# Patient Record
Sex: FEMALE | Race: BLACK OR AFRICAN AMERICAN | NOT HISPANIC OR LATINO | Employment: UNEMPLOYED | URBAN - METROPOLITAN AREA
[De-identification: names, ages, dates, MRNs, and addresses within clinical notes are randomized per-mention and may not be internally consistent; named-entity substitution may affect disease eponyms.]

---

## 2020-01-22 PROBLEM — K64.8 OTHER HEMORRHOIDS: Status: ACTIVE | Noted: 2020-01-22

## 2023-06-12 ENCOUNTER — HOSPITAL ENCOUNTER (OUTPATIENT)
Dept: MAMMOGRAPHY | Facility: HOSPITAL | Age: 58
Discharge: HOME | End: 2023-06-12
Admitting: FAMILY MEDICINE
Payer: MEDICAID

## 2023-06-12 DIAGNOSIS — Z12.31 BREAST CANCER SCREENING BY MAMMOGRAM: ICD-10-CM

## 2023-06-12 PROCEDURE — 77063 BREAST TOMOSYNTHESIS BI: CPT

## 2023-06-12 PROCEDURE — 77067 SCR MAMMO BI INCL CAD: CPT

## 2023-06-15 ENCOUNTER — HOSPITAL ENCOUNTER (OUTPATIENT)
Dept: CARDIOLOGY | Facility: HOSPITAL | Age: 58
Discharge: HOME | End: 2023-06-15
Payer: MEDICARE

## 2023-06-15 ENCOUNTER — HOSPITAL ENCOUNTER (OUTPATIENT)
Dept: GENERAL RADIOLOGY | Facility: HOSPITAL | Age: 58
Discharge: HOME | End: 2023-06-15
Payer: MEDICARE

## 2023-06-15 ENCOUNTER — TRANSCRIBE ORDERS (OUTPATIENT)
Dept: FAMILY MEDICINE CLINIC | Facility: CLINIC | Age: 58
End: 2023-06-15

## 2023-06-15 ENCOUNTER — OFFICE VISIT (OUTPATIENT)
Dept: FAMILY MEDICINE CLINIC | Facility: CLINIC | Age: 58
End: 2023-06-15
Payer: MEDICARE

## 2023-06-15 VITALS
SYSTOLIC BLOOD PRESSURE: 130 MMHG | HEIGHT: 65 IN | DIASTOLIC BLOOD PRESSURE: 78 MMHG | BODY MASS INDEX: 29.59 KG/M2 | WEIGHT: 177.6 LBS

## 2023-06-15 DIAGNOSIS — R10.31 RIGHT INGUINAL PAIN: Primary | ICD-10-CM

## 2023-06-15 DIAGNOSIS — R60.0 LOCALIZED EDEMA: ICD-10-CM

## 2023-06-15 DIAGNOSIS — R10.31 RIGHT INGUINAL PAIN: ICD-10-CM

## 2023-06-15 LAB
BH CV LOWER VASCULAR LEFT COMMON FEMORAL PHASIC: NORMAL
BH CV LOWER VASCULAR LEFT COMMON FEMORAL SPONT: NORMAL
BH CV LOWER VASCULAR RIGHT COMMON FEMORAL COMPRESS: NORMAL
BH CV LOWER VASCULAR RIGHT COMMON FEMORAL PHASIC: NORMAL
BH CV LOWER VASCULAR RIGHT COMMON FEMORAL SPONT: NORMAL
BH CV LOWER VASCULAR RIGHT DISTAL FEMORAL COMPRESS: NORMAL
BH CV LOWER VASCULAR RIGHT DISTAL FEMORAL PHASIC: NORMAL
BH CV LOWER VASCULAR RIGHT DISTAL FEMORAL SPONT: NORMAL
BH CV LOWER VASCULAR RIGHT GASTRONEMIUS COMPRESS: NORMAL
BH CV LOWER VASCULAR RIGHT GREATER SAPH AK COMPRESS: NORMAL
BH CV LOWER VASCULAR RIGHT GREATER SAPH BK COMPRESS: NORMAL
BH CV LOWER VASCULAR RIGHT LESSER SAPH COMPRESS: NORMAL
BH CV LOWER VASCULAR RIGHT MID FEMORAL COMPRESS: NORMAL
BH CV LOWER VASCULAR RIGHT MID FEMORAL PHASIC: NORMAL
BH CV LOWER VASCULAR RIGHT MID FEMORAL SPONT: NORMAL
BH CV LOWER VASCULAR RIGHT PERONEAL COMPRESS: NORMAL
BH CV LOWER VASCULAR RIGHT POPLITEAL COMPRESS: NORMAL
BH CV LOWER VASCULAR RIGHT POPLITEAL PHASIC: NORMAL
BH CV LOWER VASCULAR RIGHT POPLITEAL SPONT: NORMAL
BH CV LOWER VASCULAR RIGHT POSTERIOR TIBIAL COMPRESS: NORMAL
BH CV LOWER VASCULAR RIGHT PROFUNDA FEMORAL PHASIC: NORMAL
BH CV LOWER VASCULAR RIGHT PROFUNDA FEMORAL SPONT: NORMAL
BH CV LOWER VASCULAR RIGHT PROXIMAL FEMORAL COMPRESS: NORMAL
BH CV LOWER VASCULAR RIGHT PROXIMAL FEMORAL PHASIC: NORMAL
BH CV LOWER VASCULAR RIGHT PROXIMAL FEMORAL SPONT: NORMAL
BH CV LOWER VASCULAR RIGHT SAPHENOFEMORAL JUNCTION COMPRESS: NORMAL
BH CV LOWER VASCULAR RIGHT SAPHENOFEMORAL JUNCTION PHASIC: NORMAL
BH CV LOWER VASCULAR RIGHT SAPHENOFEMORAL JUNCTION SPONT: NORMAL

## 2023-06-15 PROCEDURE — 93971 EXTREMITY STUDY: CPT

## 2023-06-15 PROCEDURE — 3078F DIAST BP <80 MM HG: CPT | Performed by: FAMILY MEDICINE

## 2023-06-15 PROCEDURE — 73502 X-RAY EXAM HIP UNI 2-3 VIEWS: CPT

## 2023-06-15 PROCEDURE — 99214 OFFICE O/P EST MOD 30 MIN: CPT | Performed by: FAMILY MEDICINE

## 2023-06-15 PROCEDURE — 3075F SYST BP GE 130 - 139MM HG: CPT | Performed by: FAMILY MEDICINE

## 2023-06-15 NOTE — PROGRESS NOTES
Established Patient Office Visit      Patient Name: Rosa Elena Lopez  : 1965   MRN: [id number]   Care Team: Patient Care Team:  Real Lopez DO as PCP - General (Family Medicine)    Chief Complaint:    Chief Complaint   Patient presents with    Leg Pain     Pt co right leg pain for approximately 1 week       History of Present Illness: Rosa Elena Lopez is a 58 y.o. female who is here today for chief complaint.    HPI    She reports severe right inguinal/hip pain for the last 1 week.  She denies injury.  She has not taken anything over-the-counter for the pain including Tylenol or ibuprofen.  The pain is constant and not position dependent.  It is worse with ambulation but does not resolve with rest.  Rated 9/10.  She states she has had a little bit of right leg swelling, mostly on the medial aspect of the right knee.  She denies rash, fever.    This patient is accompanied by their daughter who contributes to the history of their care.    The following portions of the patient's history were reviewed and updated as appropriate: allergies, current medications, past family history, past medical history, past social history, past surgical history and problem list.    Subjective      Review of Systems:   Review of Systems - See HPI    Past Medical History:   Past Medical History:   Diagnosis Date    Hypertension        Past Surgical History:   Past Surgical History:   Procedure Laterality Date    HERNIA REPAIR      TUBAL ABDOMINAL LIGATION         Family History:   Family History   Problem Relation Age of Onset    Breast cancer Neg Hx     Ovarian cancer Neg Hx        Social History:   Social History     Socioeconomic History    Marital status: Single   Tobacco Use    Smoking status: Never    Smokeless tobacco: Never   Substance and Sexual Activity    Alcohol use: No    Drug use: No    Sexual activity: Defer       Tobacco History:   Social History     Tobacco Use   Smoking Status Never   Smokeless  "Tobacco Never       Medications:     Current Outpatient Medications:     amitriptyline (ELAVIL) 25 MG tablet, Take 1 tablet by mouth Every Night., Disp: 90 tablet, Rfl: 1    amLODIPine (NORVASC) 10 MG tablet, Take 1 tablet by mouth Daily., Disp: 90 tablet, Rfl: 3    Blood Pressure kit, Use to check your blood pressure once daily, Disp: 1 each, Rfl: 0    lisinopril (PRINIVIL,ZESTRIL) 20 MG tablet, Take 1 tablet by mouth Daily., Disp: 90 tablet, Rfl: 3    Allergies:   No Known Allergies    Objective   Objective     Physical Exam:  Vital Signs:   Vitals:    06/15/23 0929   BP: 130/78   BP Location: Right arm   Patient Position: Sitting   Cuff Size: Adult   Weight: 80.6 kg (177 lb 9.6 oz)   Height: 165.1 cm (65\")     Body mass index is 29.55 kg/m².     Physical Exam  Nursing note reviewed  Const: In visible pain both at rest and worsening with any motion  MSK: Tender to palpation right groin, pain with internal and external rotation at the right hip.  No mass appreciated, no particular joint effusion, no overlying erythema.  There is a very small amount of medial right knee swelling.  She is able to walk under her own power but avoids weightbearing on the right lower extremity  Procedures/Radiology     Procedures  No radiology results for the last 7 days     Assessment & Plan   Assessment / Plan      Assessment/Plan:   Problems Addressed This Visit  Diagnoses and all orders for this visit:    1. Right inguinal pain (Primary)  Comments:  Mild RLE swelling. Severe pain x 1 week  Orders:  -     XR Hip With or Without Pelvis 2 - 3 View Right; Future  -     US Venous Doppler Lower Extremity Right (duplex); Future    2. Localized edema  -     US Venous Doppler Lower Extremity Right (duplex); Future      Problem List Items Addressed This Visit    None  Visit Diagnoses       Right inguinal pain    -  Primary    Mild RLE swelling. Severe pain x 1 week    Relevant Orders    XR Hip With or Without Pelvis 2 - 3 View Right    US " Venous Doppler Lower Extremity Right (duplex)    Localized edema        Relevant Orders    US Venous Doppler Lower Extremity Right (duplex)            Differential includes osteoarthritis of the right hip with flare, labral tear, septic joint, blood clot.  I would like to have an ultrasound completed as soon as possible, with a right hip x-ray pending.  Treatment plan pending results.    There are no Patient Instructions on file for this visit.    Follow Up:   No follow-ups on file.      DO MELISSA Morales RD  [facility]  [address]  [facility] [address]  Fax [phone]  Phone [phone]

## 2023-09-01 ENCOUNTER — TELEPHONE (OUTPATIENT)
Dept: FAMILY MEDICINE CLINIC | Facility: CLINIC | Age: 58
End: 2023-09-01
Payer: MEDICARE

## 2023-09-01 ENCOUNTER — HOSPITAL ENCOUNTER (INPATIENT)
Facility: HOSPITAL | Age: 58
LOS: 1 days | Discharge: HOME | DRG: 305 | End: 2023-09-05
Attending: EMERGENCY MEDICINE | Admitting: HOSPITALIST
Payer: MEDICARE

## 2023-09-01 DIAGNOSIS — I10 HYPERTENSION, UNSPECIFIED TYPE: Primary | ICD-10-CM

## 2023-09-01 DIAGNOSIS — I16.1 HYPERTENSIVE EMERGENCY: ICD-10-CM

## 2023-09-01 LAB
ALBUMIN SERPL-MCNC: 4.4 G/DL (ref 3.5–5.2)
ALBUMIN/GLOB SERPL: 1.3 G/DL
ALP SERPL-CCNC: 128 U/L (ref 39–117)
ALT SERPL W P-5'-P-CCNC: 15 U/L (ref 1–33)
ANION GAP SERPL CALCULATED.3IONS-SCNC: 7 MMOL/L (ref 5–15)
AST SERPL-CCNC: 19 U/L (ref 1–32)
BASOPHILS # BLD AUTO: 0.04 10*3/MM3 (ref 0–0.2)
BASOPHILS NFR BLD AUTO: 0.6 % (ref 0–1.5)
BILIRUB SERPL-MCNC: 0.2 MG/DL (ref 0–1.2)
BUN SERPL-MCNC: 9 MG/DL (ref 8–23)
BUN/CREAT SERPL: 10.7 (ref 7–25)
CALCIUM SPEC-SCNC: 9.2 MG/DL (ref 8.6–10.5)
CHLORIDE SERPL-SCNC: 101 MMOL/L (ref 98–107)
CO2 SERPL-SCNC: 30 MMOL/L (ref 22–29)
CREAT SERPL-MCNC: 0.84 MG/DL (ref 0.57–1)
DEPRECATED RDW RBC AUTO: 47.2 FL (ref 37–54)
EGFRCR SERPLBLD CKD-EPI 2021: 79.7 ML/MIN/1.73
EOSINOPHIL # BLD AUTO: 0.29 10*3/MM3 (ref 0–0.4)
EOSINOPHIL NFR BLD AUTO: 4.1 % (ref 0.3–6.2)
ERYTHROCYTE [DISTWIDTH] IN BLOOD BY AUTOMATED COUNT: 15.6 % (ref 12.3–15.4)
GLOBULIN UR ELPH-MCNC: 3.3 GM/DL
GLUCOSE SERPL-MCNC: 86 MG/DL (ref 65–99)
HCT VFR BLD AUTO: 39.3 % (ref 34–46.6)
HGB BLD-MCNC: 12.7 G/DL (ref 12–15.9)
IMM GRANULOCYTES # BLD AUTO: 0.02 10*3/MM3 (ref 0–0.05)
IMM GRANULOCYTES NFR BLD AUTO: 0.3 % (ref 0–0.5)
LYMPHOCYTES # BLD AUTO: 2.46 10*3/MM3 (ref 0.7–3.1)
LYMPHOCYTES NFR BLD AUTO: 35.2 % (ref 19.6–45.3)
MCH RBC QN AUTO: 26.8 PG (ref 26.6–33)
MCHC RBC AUTO-ENTMCNC: 32.3 G/DL (ref 31.5–35.7)
MCV RBC AUTO: 83.1 FL (ref 79–97)
MONOCYTES # BLD AUTO: 0.74 10*3/MM3 (ref 0.1–0.9)
MONOCYTES NFR BLD AUTO: 10.6 % (ref 5–12)
NEUTROPHILS NFR BLD AUTO: 3.44 10*3/MM3 (ref 1.7–7)
NEUTROPHILS NFR BLD AUTO: 49.2 % (ref 42.7–76)
NRBC BLD AUTO-RTO: 0 /100 WBC (ref 0–0.2)
PLATELET # BLD AUTO: 256 10*3/MM3 (ref 140–450)
PMV BLD AUTO: 10.1 FL (ref 6–12)
POTASSIUM SERPL-SCNC: 3.9 MMOL/L (ref 3.5–5.2)
PROT SERPL-MCNC: 7.7 G/DL (ref 6–8.5)
RBC # BLD AUTO: 4.73 10*6/MM3 (ref 3.77–5.28)
SODIUM SERPL-SCNC: 138 MMOL/L (ref 136–145)
TROPONIN T SERPL HS-MCNC: 9 NG/L
WBC NRBC COR # BLD: 6.99 10*3/MM3 (ref 3.4–10.8)

## 2023-09-01 PROCEDURE — 99285 EMERGENCY DEPT VISIT HI MDM: CPT

## 2023-09-01 PROCEDURE — 93005 ELECTROCARDIOGRAM TRACING: CPT | Performed by: NURSE PRACTITIONER

## 2023-09-01 PROCEDURE — 36415 COLL VENOUS BLD VENIPUNCTURE: CPT

## 2023-09-01 PROCEDURE — 84443 ASSAY THYROID STIM HORMONE: CPT | Performed by: PEDIATRICS

## 2023-09-01 PROCEDURE — 84484 ASSAY OF TROPONIN QUANT: CPT | Performed by: NURSE PRACTITIONER

## 2023-09-01 PROCEDURE — G0378 HOSPITAL OBSERVATION PER HR: HCPCS

## 2023-09-01 PROCEDURE — 80053 COMPREHEN METABOLIC PANEL: CPT | Performed by: NURSE PRACTITIONER

## 2023-09-01 PROCEDURE — 85025 COMPLETE CBC W/AUTO DIFF WBC: CPT | Performed by: NURSE PRACTITIONER

## 2023-09-01 PROCEDURE — 93010 ELECTROCARDIOGRAM REPORT: CPT | Performed by: INTERNAL MEDICINE

## 2023-09-01 RX ORDER — LABETALOL HYDROCHLORIDE 5 MG/ML
10 INJECTION, SOLUTION INTRAVENOUS ONCE
Status: COMPLETED | OUTPATIENT
Start: 2023-09-01 | End: 2023-09-01

## 2023-09-01 RX ORDER — CLONIDINE HYDROCHLORIDE 0.1 MG/1
0.1 TABLET ORAL ONCE
Status: COMPLETED | OUTPATIENT
Start: 2023-09-01 | End: 2023-09-01

## 2023-09-01 RX ORDER — SODIUM CHLORIDE 0.9 % (FLUSH) 0.9 %
10 SYRINGE (ML) INJECTION AS NEEDED
Status: DISCONTINUED | OUTPATIENT
Start: 2023-09-01 | End: 2023-09-05 | Stop reason: HOSPADM

## 2023-09-01 RX ADMIN — Medication 10 MG: at 21:10

## 2023-09-01 RX ADMIN — CLONIDINE HYDROCHLORIDE 0.1 MG: 0.1 TABLET ORAL at 21:35

## 2023-09-01 RX ADMIN — Medication 10 MG: at 19:16

## 2023-09-01 RX ADMIN — NICARDIPINE HYDROCHLORIDE 5 MG/HR: 25 INJECTION, SOLUTION INTRAVENOUS at 23:22

## 2023-09-01 NOTE — ED PROVIDER NOTES
Subjective   History of Present Illness  Patient presents to the ER for hypertension.  Usually takes 20 mg lisinopril.  Patient does not take it regularly.  Recommended by her regular doctor to come to the ER for further evaluation.  Differential includes kidney dysfunction.  Hypertensive crisis.  Coronary artery disease.  We will need to get lab work evaluate kidney functions and medications to help with hypertension.  Patient tells me she was going to the dentist today to get some teeth pulled when they found out she was hypertensive and they sent her to the ER for further evaluation.      Review of Systems    Past Medical History:   Diagnosis Date    Hypertension        No Known Allergies    Past Surgical History:   Procedure Laterality Date    HERNIA REPAIR      TUBAL ABDOMINAL LIGATION         Family History   Problem Relation Age of Onset    Breast cancer Neg Hx     Ovarian cancer Neg Hx        Social History     Socioeconomic History    Marital status: Single   Tobacco Use    Smoking status: Never    Smokeless tobacco: Never   Substance and Sexual Activity    Alcohol use: No    Drug use: No    Sexual activity: Defer           Objective   Physical Exam  Constitutional:       Appearance: She is well-developed.   HENT:      Head: Normocephalic and atraumatic.      Right Ear: External ear normal.      Left Ear: External ear normal.      Nose: Nose normal.   Eyes:      Conjunctiva/sclera: Conjunctivae normal.      Pupils: Pupils are equal, round, and reactive to light.   Cardiovascular:      Rate and Rhythm: Normal rate and regular rhythm.      Heart sounds: Normal heart sounds.   Pulmonary:      Effort: Pulmonary effort is normal.      Breath sounds: Normal breath sounds.   Abdominal:      General: Bowel sounds are normal.      Palpations: Abdomen is soft.   Musculoskeletal:         General: Normal range of motion.      Cervical back: Normal range of motion and neck supple.   Skin:     General: Skin is warm and  dry.   Neurological:      Mental Status: She is alert and oriented to person, place, and time.   Psychiatric:         Behavior: Behavior normal.         Thought Content: Thought content normal.         Judgment: Judgment normal.       Critical Care  Performed by: Bienvenido Rivas APRN  Authorized by: Thierry Edwards MD     Critical care provider statement:     Critical care time (minutes):  35    Critical care time was exclusive of:  Separately billable procedures and treating other patients    Critical care was necessary to treat or prevent imminent or life-threatening deterioration of the following conditions: Hypertensive emergency.  IV Cardizem.    Critical care was time spent personally by me on the following activities:  Ordering and performing treatments and interventions, ordering and review of laboratory studies, ordering and review of radiographic studies, pulse oximetry, re-evaluation of patient's condition, review of old charts, obtaining history from patient or surrogate, examination of patient, evaluation of patient's response to treatment, discussions with consultants and development of treatment plan with patient or surrogate           ED Course  ED Course as of 09/01/23 2233   Fri Sep 01, 2023   1557 Awaiting labs blood pressure medicine []   1912 Awaiting bp meds []   2122 We will repeat her dose of blood pressure medication.  Old records reviewed.  She tells me she does have blood pressure medication but does not regularly take it.  I did give her good instructions about taking her blood pressure medications.  I am hesitant to add more medication to her regimen since she has not been taking her medications regularly.  I did encourage her to see her regular doctor this week for further evaluation and to review her medications and treatment compliance.  She did verbalize understanding.  If we are unable to get her blood pressure controlled here in the ER we may need to consider admission however  there has been some delays getting her blood pressure medication given the busyness of the ER we will continue to monitor. [JM]   2224 Patient diastolic still running in the 110s with a systolic anywhere between 170 up to 200.  I have secure chatted the hospitalist to discuss admission.  I did speak to the patient's daughter who tells me that she does not regularly take her medications and she may have had some insurance issues which has prevented her from taking her Norvasc. [JM]      ED Course User Index  [JM] Bienvenido Rivas, NEIL           No orders to display                                     Medical Decision Making  Problems Addressed:  Hypertensive emergency: complicated acute illness or injury    Amount and/or Complexity of Data Reviewed  External Data Reviewed: labs and radiology.  Labs: ordered.  ECG/medicine tests: ordered.    Risk  Prescription drug management.  Decision regarding hospitalization.    Critical Care  Total time providing critical care: 35 minutes      Final diagnoses:   Hypertension, unspecified type   Hypertensive emergency       ED Disposition  ED Disposition       ED Disposition   Decision to Admit    Condition   --    Comment   Level of Care: Telemetry [5]   Diagnosis: Hypertensive emergency [186550]   Admitting Physician: TAMANNA MEJIA [[id number]]   Attending Physician: TAMANNA MEJIA [[id number]]                 Real Lopez DO  [address] [facility] [address]  [phone]    Schedule an appointment as soon as possible for a visit       [facility] EMERGENCY DEPARTMENT  [address] [facility] [address] [phone]    If symptoms worsen         Medication List      No changes were made to your prescriptions during this visit.            Bienvenido Rivas APRN  09/01/23 2231

## 2023-09-01 NOTE — TELEPHONE ENCOUNTER
Pt's daughter called saying her mom's BP was 200/130 at the dentist today. I transferred the call to Dr. Lopez' nurse who advised she take her mom to the ER.   24

## 2023-09-02 ENCOUNTER — APPOINTMENT (OUTPATIENT)
Dept: CARDIOLOGY | Facility: HOSPITAL | Age: 58
DRG: 305 | End: 2023-09-02
Payer: MEDICARE

## 2023-09-02 LAB
ANION GAP SERPL CALCULATED.3IONS-SCNC: 12 MMOL/L (ref 5–15)
BASOPHILS # BLD AUTO: 0.03 10*3/MM3 (ref 0–0.2)
BASOPHILS NFR BLD AUTO: 0.4 % (ref 0–1.5)
BH CV ECHO MEAS - AO MAX PG: 7.1 MMHG
BH CV ECHO MEAS - AO MEAN PG: 4 MMHG
BH CV ECHO MEAS - AO ROOT DIAM: 3.2 CM
BH CV ECHO MEAS - AO V2 MAX: 133 CM/SEC
BH CV ECHO MEAS - AO V2 VTI: 25.2 CM
BH CV ECHO MEAS - AVA(I,D): 2.22 CM2
BH CV ECHO MEAS - EDV(CUBED): 50.7 ML
BH CV ECHO MEAS - EDV(MOD-SP2): 67 ML
BH CV ECHO MEAS - EDV(MOD-SP4): 77.6 ML
BH CV ECHO MEAS - EF(MOD-SP2): 60.4 %
BH CV ECHO MEAS - EF(MOD-SP4): 56.4 %
BH CV ECHO MEAS - ESV(CUBED): 8 ML
BH CV ECHO MEAS - ESV(MOD-SP2): 26.5 ML
BH CV ECHO MEAS - ESV(MOD-SP4): 33.8 ML
BH CV ECHO MEAS - FS: 45.9 %
BH CV ECHO MEAS - IVS/LVPW: 1.67 CM
BH CV ECHO MEAS - IVSD: 1.3 CM
BH CV ECHO MEAS - LA DIMENSION: 3.4 CM
BH CV ECHO MEAS - LAT PEAK E' VEL: 6.2 CM/SEC
BH CV ECHO MEAS - LV MASS(C)D: 82.3 GRAMS
BH CV ECHO MEAS - LV MAX PG: 3 MMHG
BH CV ECHO MEAS - LV MEAN PG: 2 MMHG
BH CV ECHO MEAS - LV V1 MAX: 85.9 CM/SEC
BH CV ECHO MEAS - LV V1 VTI: 17.8 CM
BH CV ECHO MEAS - LVIDD: 3.7 CM
BH CV ECHO MEAS - LVIDS: 2 CM
BH CV ECHO MEAS - LVOT AREA: 3.1 CM2
BH CV ECHO MEAS - LVOT DIAM: 2 CM
BH CV ECHO MEAS - LVPWD: 1.1 CM
BH CV ECHO MEAS - MED PEAK E' VEL: 4.4 CM/SEC
BH CV ECHO MEAS - MV A MAX VEL: 78.1 CM/SEC
BH CV ECHO MEAS - MV DEC SLOPE: 428 CM/SEC2
BH CV ECHO MEAS - MV DEC TIME: 0.23 MSEC
BH CV ECHO MEAS - MV E MAX VEL: 50.3 CM/SEC
BH CV ECHO MEAS - MV E/A: 0.64
BH CV ECHO MEAS - MV MAX PG: 4 MMHG
BH CV ECHO MEAS - MV MEAN PG: 1 MMHG
BH CV ECHO MEAS - MV P1/2T: 49.4 MSEC
BH CV ECHO MEAS - MV V2 VTI: 19.6 CM
BH CV ECHO MEAS - MVA(P1/2T): 4.5 CM2
BH CV ECHO MEAS - MVA(VTI): 2.9 CM2
BH CV ECHO MEAS - PA ACC TIME: 0.13 SEC
BH CV ECHO MEAS - RAP SYSTOLE: 3 MMHG
BH CV ECHO MEAS - RVSP: 19 MMHG
BH CV ECHO MEAS - SV(LVOT): 55.9 ML
BH CV ECHO MEAS - SV(MOD-SP2): 40.5 ML
BH CV ECHO MEAS - SV(MOD-SP4): 43.8 ML
BH CV ECHO MEAS - TAPSE (>1.6): 1.92 CM
BH CV ECHO MEAS - TR MAX PG: 16.8 MMHG
BH CV ECHO MEAS - TR MAX VEL: 204.5 CM/SEC
BH CV ECHO MEASUREMENTS AVERAGE E/E' RATIO: 9.49
BH CV XLRA - RV BASE: 2.8 CM
BH CV XLRA - RV LENGTH: 7.5 CM
BH CV XLRA - RV MID: 2.4 CM
BH CV XLRA - TDI S': 14.3 CM/SEC
BUN SERPL-MCNC: 10 MG/DL (ref 8–23)
BUN/CREAT SERPL: 12.3 (ref 7–25)
CALCIUM SPEC-SCNC: 9 MG/DL (ref 8.6–10.5)
CHLORIDE SERPL-SCNC: 102 MMOL/L (ref 98–107)
CHOLEST SERPL-MCNC: 194 MG/DL (ref 0–200)
CO2 SERPL-SCNC: 26 MMOL/L (ref 22–29)
CREAT SERPL-MCNC: 0.81 MG/DL (ref 0.57–1)
DEPRECATED RDW RBC AUTO: 46 FL (ref 37–54)
EGFRCR SERPLBLD CKD-EPI 2021: 83.2 ML/MIN/1.73
EOSINOPHIL # BLD AUTO: 0.32 10*3/MM3 (ref 0–0.4)
EOSINOPHIL NFR BLD AUTO: 4.4 % (ref 0.3–6.2)
ERYTHROCYTE [DISTWIDTH] IN BLOOD BY AUTOMATED COUNT: 15.5 % (ref 12.3–15.4)
GLUCOSE SERPL-MCNC: 96 MG/DL (ref 65–99)
HBA1C MFR BLD: 6.3 % (ref 4.8–5.6)
HCT VFR BLD AUTO: 38.2 % (ref 34–46.6)
HDLC SERPL-MCNC: 50 MG/DL (ref 40–60)
HGB BLD-MCNC: 12.3 G/DL (ref 12–15.9)
IMM GRANULOCYTES # BLD AUTO: 0.02 10*3/MM3 (ref 0–0.05)
IMM GRANULOCYTES NFR BLD AUTO: 0.3 % (ref 0–0.5)
LDLC SERPL CALC-MCNC: 124 MG/DL (ref 0–100)
LDLC/HDLC SERPL: 2.42 {RATIO}
LEFT ATRIUM VOLUME INDEX: 12.1 ML/M2
LYMPHOCYTES # BLD AUTO: 2.66 10*3/MM3 (ref 0.7–3.1)
LYMPHOCYTES NFR BLD AUTO: 36.2 % (ref 19.6–45.3)
MAGNESIUM SERPL-MCNC: 2 MG/DL (ref 1.6–2.4)
MCH RBC QN AUTO: 26.3 PG (ref 26.6–33)
MCHC RBC AUTO-ENTMCNC: 32.2 G/DL (ref 31.5–35.7)
MCV RBC AUTO: 81.8 FL (ref 79–97)
MONOCYTES # BLD AUTO: 0.75 10*3/MM3 (ref 0.1–0.9)
MONOCYTES NFR BLD AUTO: 10.2 % (ref 5–12)
NEUTROPHILS NFR BLD AUTO: 3.56 10*3/MM3 (ref 1.7–7)
NEUTROPHILS NFR BLD AUTO: 48.5 % (ref 42.7–76)
NRBC BLD AUTO-RTO: 0 /100 WBC (ref 0–0.2)
PHOSPHATE SERPL-MCNC: 3.4 MG/DL (ref 2.5–4.5)
PLATELET # BLD AUTO: 257 10*3/MM3 (ref 140–450)
PMV BLD AUTO: 10.8 FL (ref 6–12)
POTASSIUM SERPL-SCNC: 4 MMOL/L (ref 3.5–5.2)
RBC # BLD AUTO: 4.67 10*6/MM3 (ref 3.77–5.28)
SODIUM SERPL-SCNC: 140 MMOL/L (ref 136–145)
TRIGL SERPL-MCNC: 114 MG/DL (ref 0–150)
TSH SERPL DL<=0.05 MIU/L-ACNC: 1.07 UIU/ML (ref 0.27–4.2)
VLDLC SERPL-MCNC: 20 MG/DL (ref 5–40)
WBC NRBC COR # BLD: 7.34 10*3/MM3 (ref 3.4–10.8)

## 2023-09-02 PROCEDURE — 93306 TTE W/DOPPLER COMPLETE: CPT | Performed by: INTERNAL MEDICINE

## 2023-09-02 PROCEDURE — 85025 COMPLETE CBC W/AUTO DIFF WBC: CPT | Performed by: PEDIATRICS

## 2023-09-02 PROCEDURE — 83735 ASSAY OF MAGNESIUM: CPT | Performed by: PEDIATRICS

## 2023-09-02 PROCEDURE — 93306 TTE W/DOPPLER COMPLETE: CPT

## 2023-09-02 PROCEDURE — 83036 HEMOGLOBIN GLYCOSYLATED A1C: CPT | Performed by: PEDIATRICS

## 2023-09-02 PROCEDURE — G0378 HOSPITAL OBSERVATION PER HR: HCPCS

## 2023-09-02 PROCEDURE — 80048 BASIC METABOLIC PNL TOTAL CA: CPT | Performed by: PEDIATRICS

## 2023-09-02 PROCEDURE — 80061 LIPID PANEL: CPT | Performed by: PEDIATRICS

## 2023-09-02 PROCEDURE — 84100 ASSAY OF PHOSPHORUS: CPT | Performed by: PEDIATRICS

## 2023-09-02 RX ORDER — CHOLECALCIFEROL (VITAMIN D3) 125 MCG
5 CAPSULE ORAL NIGHTLY PRN
Status: DISCONTINUED | OUTPATIENT
Start: 2023-09-02 | End: 2023-09-05 | Stop reason: HOSPADM

## 2023-09-02 RX ORDER — SODIUM CHLORIDE 9 MG/ML
40 INJECTION, SOLUTION INTRAVENOUS AS NEEDED
Status: DISCONTINUED | OUTPATIENT
Start: 2023-09-02 | End: 2023-09-05 | Stop reason: HOSPADM

## 2023-09-02 RX ORDER — SODIUM CHLORIDE 0.9 % (FLUSH) 0.9 %
10 SYRINGE (ML) INJECTION AS NEEDED
Status: DISCONTINUED | OUTPATIENT
Start: 2023-09-02 | End: 2023-09-05 | Stop reason: HOSPADM

## 2023-09-02 RX ORDER — POLYETHYLENE GLYCOL 3350 17 G/17G
17 POWDER, FOR SOLUTION ORAL DAILY PRN
Status: DISCONTINUED | OUTPATIENT
Start: 2023-09-02 | End: 2023-09-05 | Stop reason: HOSPADM

## 2023-09-02 RX ORDER — BISACODYL 5 MG/1
5 TABLET, DELAYED RELEASE ORAL DAILY PRN
Status: DISCONTINUED | OUTPATIENT
Start: 2023-09-02 | End: 2023-09-05 | Stop reason: HOSPADM

## 2023-09-02 RX ORDER — BISACODYL 10 MG
10 SUPPOSITORY, RECTAL RECTAL DAILY PRN
Status: DISCONTINUED | OUTPATIENT
Start: 2023-09-02 | End: 2023-09-05 | Stop reason: HOSPADM

## 2023-09-02 RX ORDER — NITROGLYCERIN 0.4 MG/1
0.4 TABLET SUBLINGUAL
Status: DISCONTINUED | OUTPATIENT
Start: 2023-09-02 | End: 2023-09-05 | Stop reason: HOSPADM

## 2023-09-02 RX ORDER — LISINOPRIL 20 MG/1
20 TABLET ORAL DAILY
Status: DISCONTINUED | OUTPATIENT
Start: 2023-09-02 | End: 2023-09-05

## 2023-09-02 RX ORDER — AMOXICILLIN 250 MG
2 CAPSULE ORAL 2 TIMES DAILY
Status: DISCONTINUED | OUTPATIENT
Start: 2023-09-02 | End: 2023-09-05 | Stop reason: HOSPADM

## 2023-09-02 RX ORDER — SODIUM CHLORIDE 0.9 % (FLUSH) 0.9 %
10 SYRINGE (ML) INJECTION EVERY 12 HOURS SCHEDULED
Status: DISCONTINUED | OUTPATIENT
Start: 2023-09-02 | End: 2023-09-05 | Stop reason: HOSPADM

## 2023-09-02 RX ORDER — IBUPROFEN 400 MG/1
600 TABLET ORAL EVERY 6 HOURS PRN
Status: DISCONTINUED | OUTPATIENT
Start: 2023-09-02 | End: 2023-09-05 | Stop reason: HOSPADM

## 2023-09-02 RX ORDER — ACETAMINOPHEN 325 MG/1
650 TABLET ORAL EVERY 4 HOURS PRN
Status: DISCONTINUED | OUTPATIENT
Start: 2023-09-02 | End: 2023-09-05 | Stop reason: HOSPADM

## 2023-09-02 RX ADMIN — Medication 10 ML: at 21:13

## 2023-09-02 RX ADMIN — LISINOPRIL 20 MG: 20 TABLET ORAL at 08:28

## 2023-09-02 RX ADMIN — Medication 10 ML: at 08:29

## 2023-09-02 RX ADMIN — SENNOSIDES AND DOCUSATE SODIUM 2 TABLET: 8.6; 5 TABLET ORAL at 08:28

## 2023-09-02 NOTE — H&P
[facility] Medicine Services  HISTORY AND PHYSICAL    Patient Name: Rosa Elena Lopez  : 1965  MRN: [id number]  Primary Care Physician: Real Lopez DO  Date of admission: 2023      Subjective   Subjective     Chief Complaint:  High blood pressure    HPI:  Rosa Elena Lopez is a 58 y.o. female with a history of hypertension was sent here by her PCP with elevated blood pressures.  Patient has been diagnosed with hypertension in the past but does not remember to take her medicines due to not feeling back.  Blood pressure has been high recently on multiple occasions because she was going to the dentist to get her teeth extracted and could not undergo the procedure due to her high blood pressure.  She does admit to intermittent dizziness as well as an almost daily headache.  She is unable to tell me anything that exacerbates or helps her headache.  She does also complain of some shortness of breath that occurs in the middle of the night, but no orthopnea.  Denies any chest pain.  No history of lower extremity edema.  Daughter gives most of the history over the phone and states that she was supposed to be on 2 blood pressure medicines but the insurance company was not paying for the second one.      Review of Systems   Constitutional:  Negative for activity change, appetite change and fever.   Eyes:         Needs glasses   Respiratory:          +PND   Cardiovascular:  Negative for chest pain, palpitations and leg swelling.   Gastrointestinal:  Negative for diarrhea, nausea and vomiting.   Endocrine: Negative.    Genitourinary: Negative.    Musculoskeletal: Negative.    Skin: Negative.    Neurological:  Positive for headaches. Negative for dizziness.   Psychiatric/Behavioral: Negative.          Personal History     Past Medical History:   Diagnosis Date    Hypertension              Past Surgical History:   Procedure Laterality Date    HERNIA REPAIR      TUBAL ABDOMINAL  LIGATION         Family History: family history is not on file.     Social History:  reports that she has never smoked. She has never used smokeless tobacco. She reports that she does not drink alcohol and does not use drugs.  Social History     Social History Narrative    Lives with daughter       Medications:  Available home medication information reviewed.  (Not in a hospital admission)      No Known Allergies    Objective   Objective     Vital Signs:   Temp:  [98.2 °F (36.8 °C)] 98.2 °F (36.8 °C)  Heart Rate:  [66-94] 68  Resp:  [18] 18  BP: (169-200)/(104-118) 178/112       Physical Exam   Constitutional: Awake, alert  Eyes: PERRLA, sclerae anicteric, no conjunctival injection  HENT: NCAT, mucous membranes moist, multiple missing teeth  Neck: Supple, no thyromegaly, no lymphadenopathy, trachea midline  Respiratory: Clear to auscultation bilaterally, nonlabored respirations   Cardiovascular: RRR, soft 2/6 systolic murmurs, palpable pedal pulses bilaterally  Gastrointestinal: Positive bowel sounds, soft, nontender, nondistended  Musculoskeletal: No bilateral ankle edema, no clubbing or cyanosis to extremities  Psychiatric: Appropriate affect, cooperative  Neurologic: Oriented x 3, strength symmetric in all extremities, Cranial Nerves grossly intact to confrontation, speech clear  Skin: No rashes      Result Review:  I have personally reviewed the results from the time of this admission to 9/1/2023 23:15 EDT and agree with these findings:  [x]  Laboratory list / accordion  []  Microbiology  []  Radiology  [x]  EKG/Telemetry   []  Cardiology/Vascular   []  Pathology  []  Old records  []  Other:  Most notable findings include:         LAB RESULTS:      Lab 09/01/23  1521   WBC 6.99   HEMOGLOBIN 12.7   HEMATOCRIT 39.3   PLATELETS 256   NEUTROS ABS 3.44   IMMATURE GRANS (ABS) 0.02   LYMPHS ABS 2.46   MONOS ABS 0.74   EOS ABS 0.29   MCV 83.1         Lab 09/01/23  1521   SODIUM 138   POTASSIUM 3.9   CHLORIDE 101   CO2  30.0*   ANION GAP 7.0   BUN 9   CREATININE 0.84   EGFR 79.7   GLUCOSE 86   CALCIUM 9.2         Lab 09/01/23  1521   TOTAL PROTEIN 7.7   ALBUMIN 4.4   GLOBULIN 3.3   ALT (SGPT) 15   AST (SGOT) 19   BILIRUBIN 0.2   ALK PHOS 128*         Lab 09/01/23  1521   HSTROP T 9                     Microbiology Results (last 10 days)       ** No results found for the last 240 hours. 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 female with a history of hypertension being admitted for hypertensive emergency that has been resistant to multiple medicines given in the emergency department.    Hypertensive emergency  -Start patient on a Cardene drip and can titrate up as needed.  Goal will be to bring BP down slowly, with goal of SBP between 160-180, DBP .  -Cont home lisinopril.  Most likely will need 2nd agent.  Discussed with pt and daughter the importance of compliance.  -No evidence of end organ damage at this time.  -Check HgA1c and Lipid panel.  Prior lipid panel elevated total and LDL, probably would benefit from statin.  -TSH    Dyspnea--PND  -ECHO in AM.    DVT prophylaxis:  ambulatory      CODE STATUS:    Code Status and Medical Interventions:   Ordered at: 09/01/23 2301     Level Of Support Discussed With:    Patient     Code Status (Patient has no pulse and is not breathing):    CPR (Attempt to Resuscitate)     Medical Interventions (Patient has pulse or is breathing):    Full Support       Expected Discharge  Expected Discharge Date: 9/2/2023; Expected Discharge Time:      Laurie Giang MD  09/01/23

## 2023-09-02 NOTE — PROGRESS NOTES
[facility] Medicine Services  PROGRESS NOTE    Patient Name: Rosa Elena Lopez  : 1965  MRN: [id number]    Date of Admission: 2023  Primary Care Physician: Real Lopez DO    Subjective   Subjective     CC:  Hypertensive urgency    HPI:  Resting in bed no acute distress but complains of some lightheadedness.  Does not have any other specific complaint.  No fever or chills.  No chest pain or palpitation or shortness of breath.  No nausea vomiting or diarrhea or abdominal pain.  No visual changes.    ROS:  As above    Objective   Objective     Vital Signs:   Temp:  [97.8 °F (36.6 °C)-98.5 °F (36.9 °C)] 98.5 °F (36.9 °C)  Heart Rate:  [66-90] 80  Resp:  [18] 18  BP: (127-200)/() 141/94     Physical Exam:  Constitutional: No acute distress, awake, alert  HENT: NCAT, mucous membranes moist  Respiratory: Clear to auscultation bilaterally, respiratory effort normal   Cardiovascular: RRR, no murmurs, rubs, or gallops  Gastrointestinal: Positive bowel sounds, soft, nontender, nondistended  Musculoskeletal: No bilateral ankle edema  Psychiatric: Appropriate affect, cooperative  Neurologic: Oriented x 3, strength symmetric in all extremities, Cranial Nerves grossly intact to confrontation, speech clear  Skin: No rashes     Results Reviewed:  LAB RESULTS:      Lab 23  0536 23  1521   WBC 7.34 6.99   HEMOGLOBIN 12.3 12.7   HEMATOCRIT 38.2 39.3   PLATELETS 257 256   NEUTROS ABS 3.56 3.44   IMMATURE GRANS (ABS) 0.02 0.02   LYMPHS ABS 2.66 2.46   MONOS ABS 0.75 0.74   EOS ABS 0.32 0.29   MCV 81.8 83.1         Lab 23  0536 23  1521   SODIUM 140 138   POTASSIUM 4.0 3.9   CHLORIDE 102 101   CO2 26.0 30.0*   ANION GAP 12.0 7.0   BUN 10 9   CREATININE 0.81 0.84   EGFR 83.2 79.7   GLUCOSE 96 86   CALCIUM 9.0 9.2   MAGNESIUM 2.0  --    PHOSPHORUS 3.4  --    HEMOGLOBIN A1C 6.30*  --    TSH  --  1.070         Lab 23  1521   TOTAL PROTEIN 7.7   ALBUMIN 4.4    GLOBULIN 3.3   ALT (SGPT) 15   AST (SGOT) 19   BILIRUBIN 0.2   ALK PHOS 128*         Lab 09/01/23  1521   HSTROP T 9         Lab 09/02/23  0536   CHOLESTEROL 194   LDL CHOL 124*   HDL CHOL 50   TRIGLYCERIDES 114             Brief Urine Lab Results       None            Microbiology Results Abnormal       None            No radiology results from the last 24 hrs        Current medications:  Scheduled Meds:lisinopril, 20 mg, Oral, Daily  senna-docusate sodium, 2 tablet, Oral, BID  sodium chloride, 10 mL, Intravenous, Q12H      Continuous Infusions:niCARdipine, 5-15 mg/hr, Last Rate: Stopped (09/02/23 0930)      PRN Meds:.  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        Brief Hospital Course to date:  Rosa Elena Lopez is a 58 y.o. female . female with a history of hypertension being admitted for hypertensive emergency that has been resistant to multiple medicines given in the emergency department.     Hypertensive emergency  -Start patient on a Cardene drip Bullets now off the drip.  She was started on lisinopril 20 mg p.o. and currently blood pressure systolically is around 140s to 150s.  -No evidence of end organ damage at this time.  -Hemoglobin A1c is elevated at 6.30 but patient does not have any diagnosis of diabetes.       Dyspnea--PND  -Echo pending.         Expected Discharge Location and Transportation: Home  Expected Discharge soon, possibly tomorrow.  Expected Discharge Date: 9/2/2023; Expected Discharge Time:      DVT prophylaxis:  Mechanical DVT prophylaxis orders are  present.     AM-PAC 6 Clicks Score (PT): 24 (09/02/23 0800)    CODE STATUS:   Code Status and Medical Interventions:   Ordered at: 09/01/23 2301     Level Of Support Discussed With:    Patient     Code Status (Patient has no pulse and is not breathing):    CPR (Attempt to Resuscitate)     Medical Interventions (Patient has pulse or is breathing):    Full Support       Luis Dong MD  09/02/23

## 2023-09-02 NOTE — PLAN OF CARE
Goal Outcome Evaluation:  Plan of Care Reviewed With: patient           Outcome Evaluation: PT RESTING IN BED BLOOD PRESSURE ON CARDENE GTT /82

## 2023-09-02 NOTE — DISCHARGE INSTRUCTIONS
Please take your pressure medication as advised.  Touch base with your regular doctor early this week to review your blood pressure medications and your compliance.

## 2023-09-03 ENCOUNTER — APPOINTMENT (OUTPATIENT)
Dept: CARDIOLOGY | Facility: HOSPITAL | Age: 58
DRG: 305 | End: 2023-09-03
Payer: MEDICARE

## 2023-09-03 ENCOUNTER — APPOINTMENT (OUTPATIENT)
Dept: CT IMAGING | Facility: HOSPITAL | Age: 58
DRG: 305 | End: 2023-09-03
Payer: MEDICARE

## 2023-09-03 LAB
ANION GAP SERPL CALCULATED.3IONS-SCNC: 9 MMOL/L (ref 5–15)
BUN SERPL-MCNC: 14 MG/DL (ref 8–23)
BUN/CREAT SERPL: 14.9 (ref 7–25)
CALCIUM SPEC-SCNC: 9.5 MG/DL (ref 8.6–10.5)
CHLORIDE SERPL-SCNC: 104 MMOL/L (ref 98–107)
CO2 SERPL-SCNC: 25 MMOL/L (ref 22–29)
CREAT SERPL-MCNC: 0.94 MG/DL (ref 0.57–1)
EGFRCR SERPLBLD CKD-EPI 2021: 69.6 ML/MIN/1.73
GLUCOSE SERPL-MCNC: 101 MG/DL (ref 65–99)
POTASSIUM SERPL-SCNC: 4.2 MMOL/L (ref 3.5–5.2)
QT INTERVAL: 372 MS
QTC INTERVAL: 409 MS
SODIUM SERPL-SCNC: 138 MMOL/L (ref 136–145)

## 2023-09-03 PROCEDURE — 70450 CT HEAD/BRAIN W/O DYE: CPT

## 2023-09-03 PROCEDURE — 93880 EXTRACRANIAL BILAT STUDY: CPT | Performed by: INTERNAL MEDICINE

## 2023-09-03 PROCEDURE — 80048 BASIC METABOLIC PNL TOTAL CA: CPT | Performed by: INTERNAL MEDICINE

## 2023-09-03 PROCEDURE — G0378 HOSPITAL OBSERVATION PER HR: HCPCS

## 2023-09-03 PROCEDURE — 93880 EXTRACRANIAL BILAT STUDY: CPT

## 2023-09-03 RX ORDER — MECLIZINE HYDROCHLORIDE 25 MG/1
12.5 TABLET ORAL EVERY 8 HOURS SCHEDULED
Status: DISCONTINUED | OUTPATIENT
Start: 2023-09-03 | End: 2023-09-05 | Stop reason: HOSPADM

## 2023-09-03 RX ADMIN — MECLIZINE 12.5 MG: 25 TABLET ORAL at 17:25

## 2023-09-03 RX ADMIN — Medication 10 ML: at 22:28

## 2023-09-03 RX ADMIN — SENNOSIDES AND DOCUSATE SODIUM 2 TABLET: 8.6; 5 TABLET ORAL at 22:23

## 2023-09-03 RX ADMIN — MECLIZINE 12.5 MG: 25 TABLET ORAL at 22:23

## 2023-09-03 RX ADMIN — IBUPROFEN 600 MG: 400 TABLET, FILM COATED ORAL at 08:38

## 2023-09-03 RX ADMIN — LISINOPRIL 20 MG: 20 TABLET ORAL at 08:29

## 2023-09-03 RX ADMIN — Medication 10 ML: at 08:30

## 2023-09-03 NOTE — PLAN OF CARE
Goal Outcome Evaluation:  Plan of Care Reviewed With: patient           Outcome Evaluation: Pt resting in bed without complaints

## 2023-09-03 NOTE — PROGRESS NOTES
[facility] Medicine Services  PROGRESS NOTE    Patient Name: Rosa Elena Lopez  : 1965  MRN: [id number]    Date of Admission: 2023  Primary Care Physician: Real Lopez DO    Subjective   Subjective     CC:  Hypertensive urgency    HPI:  Resting in bed no acute distress but complains of dizziness.  No fever or chills.  No chest pain or palpitation or shortness of breath.  No nausea vomiting or diarrhea or abdominal pain.  No visual changes.    ROS:  As above    Objective   Objective     Vital Signs:   Temp:  [98 °F (36.7 °C)-98.7 °F (37.1 °C)] 98.1 °F (36.7 °C)  Heart Rate:  [71-94] 94  Resp:  [18] 18  BP: (136-173)/() 147/95     Physical Exam:  Constitutional: No acute distress, awake, alert  HENT: NCAT, mucous membranes moist  Respiratory: Clear to auscultation bilaterally, respiratory effort normal   Cardiovascular: RRR, no murmurs, rubs, or gallops  Gastrointestinal: Positive bowel sounds, soft, nontender, nondistended  Musculoskeletal: No bilateral ankle edema  Psychiatric: Appropriate affect, cooperative  Neurologic: Oriented x 3, strength symmetric in all extremities, Cranial Nerves grossly intact to confrontation, does have difficulty with memory.  Speech clear  Skin: No rashes     Results Reviewed:  LAB RESULTS:      Lab 23  0536 23  1521   WBC 7.34 6.99   HEMOGLOBIN 12.3 12.7   HEMATOCRIT 38.2 39.3   PLATELETS 257 256   NEUTROS ABS 3.56 3.44   IMMATURE GRANS (ABS) 0.02 0.02   LYMPHS ABS 2.66 2.46   MONOS ABS 0.75 0.74   EOS ABS 0.32 0.29   MCV 81.8 83.1         Lab 23  0715 23  0536 23  1521   SODIUM 138 140 138   POTASSIUM 4.2 4.0 3.9   CHLORIDE 104 102 101   CO2 25.0 26.0 30.0*   ANION GAP 9.0 12.0 7.0   BUN 14 10 9   CREATININE 0.94 0.81 0.84   EGFR 69.6 83.2 79.7   GLUCOSE 101* 96 86   CALCIUM 9.5 9.0 9.2   MAGNESIUM  --  2.0  --    PHOSPHORUS  --  3.4  --    HEMOGLOBIN A1C  --  6.30*  --    TSH  --   --  1.070          Lab 09/01/23  1521   TOTAL PROTEIN 7.7   ALBUMIN 4.4   GLOBULIN 3.3   ALT (SGPT) 15   AST (SGOT) 19   BILIRUBIN 0.2   ALK PHOS 128*         Lab 09/01/23  1521   HSTROP T 9         Lab 09/02/23  0536   CHOLESTEROL 194   LDL CHOL 124*   HDL CHOL 50   TRIGLYCERIDES 114             Brief Urine Lab Results       None            Microbiology Results Abnormal       None            Adult Transthoracic Echo Complete w/ Color, Spectral and Contrast if necessary per protocol    Result Date: 9/2/2023    Left ventricular ejection fraction appears to be 56 - 60%.   Left ventricular wall thickness is consistent with mild concentric hypertrophy   Mild tricuspid valve regurgitation is present. Estimated right ventricular systolic pressure from tricuspid regurgitation is normal (<35 mmHg).      Results for orders placed during the hospital encounter of 09/01/23    Adult Transthoracic Echo Complete w/ Color, Spectral and Contrast if necessary per protocol    Interpretation Summary    Left ventricular ejection fraction appears to be 56 - 60%.    Left ventricular wall thickness is consistent with mild concentric hypertrophy    Mild tricuspid valve regurgitation is present. Estimated right ventricular systolic pressure from tricuspid regurgitation is normal (<35 mmHg).      Current medications:  Scheduled Meds:lisinopril, 20 mg, Oral, Daily  senna-docusate sodium, 2 tablet, Oral, BID  sodium chloride, 10 mL, Intravenous, Q12H      Continuous Infusions:     PRN Meds:.  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        Brief Hospital Course to date:  Rosa Elena Lopez is a 58 y.o. female . female with a history of hypertension being admitted for hypertensive emergency that has been resistant to multiple medicines given in the emergency department.     Hypertensive emergency  -Start patient on a Cardene drip Bullets now off the drip.  She was started on lisinopril 20 mg p.o. and currently blood pressure systolically is around 140s to 150s.  -No evidence of end organ damage at this time.  -Hemoglobin A1c is elevated at 6.30 but patient does not have any diagnosis of diabetes.       Dyspnea--PND  -Echo does not show any significant abnormality.  Ejection fraction is normal.  It is very mild tricuspid regurgitation.    Dizziness  -Patient's daughter tells me that even prior to this admission patient has had few episodes of dizziness.  As far as the etiology is concerned it is not quite clear, however, high blood pressure could have caused dizziness.  Patient complained about lightheadedness yesterday as her blood pressure was brought from above 200 systolically to close to 145.  It was thought that probably 145 is a little too low for her.  However, today she is still dizzy and as mentioned above she has had history of dizziness.  -We will get a CT scan of the head and also duplex of carotids.  -We also can try Antivert.    Mild cognitive impairment and learning disability.  Patient does have some memory deficit but she is oriented.         Expected Discharge Location and Transportation: Home  Expected Discharge soon, possibly tomorrow.  Expected Discharge Date: 9/2/2023; Expected Discharge Time:      DVT prophylaxis:  Mechanical DVT prophylaxis orders are present.     AM-PAC 6 Clicks Score (PT): 24 (09/03/23 0800)    CODE STATUS:   Code Status and Medical Interventions:   Ordered at: 09/01/23 2301     Level Of Support Discussed With:    Patient     Code Status (Patient has no pulse and is not breathing):    CPR  (Attempt to Resuscitate)     Medical Interventions (Patient has pulse or is breathing):    Full Support       Luis Dong MD  09/03/23

## 2023-09-04 LAB
BH CV XLRA MEAS LEFT DIST CCA EDV: 28 CM/SEC
BH CV XLRA MEAS LEFT DIST CCA PSV: 78.5 CM/SEC
BH CV XLRA MEAS LEFT DIST ICA EDV: 58.1 CM/SEC
BH CV XLRA MEAS LEFT DIST ICA PSV: 121 CM/SEC
BH CV XLRA MEAS LEFT ICA/CCA RATIO: 0.79
BH CV XLRA MEAS LEFT MID CCA EDV: 23.6 CM/SEC
BH CV XLRA MEAS LEFT MID CCA PSV: 73 CM/SEC
BH CV XLRA MEAS LEFT MID ICA EDV: 54.6 CM/SEC
BH CV XLRA MEAS LEFT MID ICA PSV: 122 CM/SEC
BH CV XLRA MEAS LEFT PROX CCA EDV: 20.4 CM/SEC
BH CV XLRA MEAS LEFT PROX CCA PSV: 94.9 CM/SEC
BH CV XLRA MEAS LEFT PROX ECA PSV: 71.6 CM/SEC
BH CV XLRA MEAS LEFT PROX ICA EDV: 25.8 CM/SEC
BH CV XLRA MEAS LEFT PROX ICA PSV: 57.6 CM/SEC
BH CV XLRA MEAS LEFT PROX SCLA PSV: 307 CM/SEC
BH CV XLRA MEAS LEFT VERTEBRAL A PSV: 58 CM/SEC
BH CV XLRA MEAS RIGHT DIST CCA EDV: 22.1 CM/SEC
BH CV XLRA MEAS RIGHT DIST CCA PSV: 64.4 CM/SEC
BH CV XLRA MEAS RIGHT DIST ICA EDV: 48.5 CM/SEC
BH CV XLRA MEAS RIGHT DIST ICA PSV: 109 CM/SEC
BH CV XLRA MEAS RIGHT ICA/CCA RATIO: 0.91
BH CV XLRA MEAS RIGHT MID CCA EDV: 18.2 CM/SEC
BH CV XLRA MEAS RIGHT MID CCA PSV: 67.8 CM/SEC
BH CV XLRA MEAS RIGHT MID ICA EDV: 44.7 CM/SEC
BH CV XLRA MEAS RIGHT MID ICA PSV: 96.3 CM/SEC
BH CV XLRA MEAS RIGHT PROX CCA EDV: 12.3 CM/SEC
BH CV XLRA MEAS RIGHT PROX CCA PSV: 73.2 CM/SEC
BH CV XLRA MEAS RIGHT PROX ECA PSV: 82.6 CM/SEC
BH CV XLRA MEAS RIGHT PROX ICA EDV: 26 CM/SEC
BH CV XLRA MEAS RIGHT PROX ICA PSV: 61.4 CM/SEC
BH CV XLRA MEAS RIGHT PROX SCLA PSV: 118 CM/SEC
BH CV XLRA MEAS RIGHT VERTEBRAL A PSV: 59.6 CM/SEC
LEFT ARM BP: NORMAL MMHG

## 2023-09-04 PROCEDURE — 25010000002 HYDRALAZINE PER 20 MG: Performed by: NURSE PRACTITIONER

## 2023-09-04 PROCEDURE — G0378 HOSPITAL OBSERVATION PER HR: HCPCS

## 2023-09-04 RX ORDER — NIFEDIPINE 10 MG/1
10 CAPSULE ORAL EVERY 8 HOURS SCHEDULED
Status: DISCONTINUED | OUTPATIENT
Start: 2023-09-04 | End: 2023-09-05

## 2023-09-04 RX ORDER — HYDRALAZINE HYDROCHLORIDE 20 MG/ML
10 INJECTION INTRAMUSCULAR; INTRAVENOUS ONCE
Status: COMPLETED | OUTPATIENT
Start: 2023-09-04 | End: 2023-09-04

## 2023-09-04 RX ADMIN — NIFEDIPINE 10 MG: 10 CAPSULE ORAL at 21:42

## 2023-09-04 RX ADMIN — Medication 10 ML: at 08:40

## 2023-09-04 RX ADMIN — MECLIZINE 12.5 MG: 25 TABLET ORAL at 14:02

## 2023-09-04 RX ADMIN — NIFEDIPINE 10 MG: 10 CAPSULE ORAL at 14:02

## 2023-09-04 RX ADMIN — Medication 10 ML: at 21:43

## 2023-09-04 RX ADMIN — LISINOPRIL 20 MG: 20 TABLET ORAL at 08:39

## 2023-09-04 RX ADMIN — MECLIZINE 12.5 MG: 25 TABLET ORAL at 21:42

## 2023-09-04 RX ADMIN — ACETAMINOPHEN, ASPIRIN, CAFFEINE 2 TABLET: 250; 65; 250 TABLET, FILM COATED ORAL at 12:22

## 2023-09-04 RX ADMIN — HYDRALAZINE HYDROCHLORIDE 10 MG: 20 INJECTION INTRAMUSCULAR; INTRAVENOUS at 00:19

## 2023-09-04 RX ADMIN — IBUPROFEN 600 MG: 400 TABLET, FILM COATED ORAL at 17:12

## 2023-09-04 RX ADMIN — SENNOSIDES AND DOCUSATE SODIUM 2 TABLET: 8.6; 5 TABLET ORAL at 21:45

## 2023-09-04 RX ADMIN — MECLIZINE 12.5 MG: 25 TABLET ORAL at 05:25

## 2023-09-04 NOTE — PROGRESS NOTES
[facility] Medicine Services  PROGRESS NOTE    Patient Name: Rosa Elena Lopez  : 1965  MRN: [id number]    Date of Admission: 2023  Primary Care Physician: Real Lopez DO    Subjective   Subjective     CC:  F/u hypertensive urgency    HPI:  Patient resting in bed.  Says she has a headache and woke up with 1 this morning.  BP rechecked and is 153/101. Denies other concerns. Discussed trying Excedrin and nifedipine and possibly dc'ing home later today if BP and HA improved.    ROS:  Gen- No fevers, chills  CV- No chest pain, palpitations  Resp- No cough, dyspnea  GI- No N/V/D, abd pain       Objective   Objective     Vital Signs:   Temp:  [97.8 °F (36.6 °C)-98.6 °F (37 °C)] 97.8 °F (36.6 °C)  Heart Rate:  [64-90] 81  Resp:  [18] 18  BP: (155-183)/() 155/97     Physical Exam:  Constitutional: No acute distress, awake, alert  HENT: NCAT, mucous membranes moist  Respiratory: Clear to auscultation bilaterally, respiratory effort normal, room air  Cardiovascular: RRR, no murmurs, rubs, or gallops  Gastrointestinal: Positive bowel sounds, soft, nontender, nondistended  Musculoskeletal: No bilateral ankle edema  Psychiatric: Appropriate affect, cooperative  Neurologic: Oriented x 3, strength symmetric in all extremities, Cranial Nerves grossly intact to confrontation, speech clear  Skin: No rashes      Results Reviewed:  LAB RESULTS:      Lab 23  0536 23  1521   WBC 7.34 6.99   HEMOGLOBIN 12.3 12.7   HEMATOCRIT 38.2 39.3   PLATELETS 257 256   NEUTROS ABS 3.56 3.44   IMMATURE GRANS (ABS) 0.02 0.02   LYMPHS ABS 2.66 2.46   MONOS ABS 0.75 0.74   EOS ABS 0.32 0.29   MCV 81.8 83.1         Lab 23  0715 23  0536 23  1521   SODIUM 138 140 138   POTASSIUM 4.2 4.0 3.9   CHLORIDE 104 102 101   CO2 25.0 26.0 30.0*   ANION GAP 9.0 12.0 7.0   BUN 14 10 9   CREATININE 0.94 0.81 0.84   EGFR 69.6 83.2 79.7   GLUCOSE 101* 96 86   CALCIUM 9.5 9.0 9.2    MAGNESIUM  --  2.0  --    PHOSPHORUS  --  3.4  --    HEMOGLOBIN A1C  --  6.30*  --    TSH  --   --  1.070         Lab 09/01/23  1521   TOTAL PROTEIN 7.7   ALBUMIN 4.4   GLOBULIN 3.3   ALT (SGPT) 15   AST (SGOT) 19   BILIRUBIN 0.2   ALK PHOS 128*         Lab 09/01/23  1521   HSTROP T 9         Lab 09/02/23  0536   CHOLESTEROL 194   LDL CHOL 124*   HDL CHOL 50   TRIGLYCERIDES 114             Brief Urine Lab Results       None            Microbiology Results Abnormal       None            Adult Transthoracic Echo Complete w/ Color, Spectral and Contrast if necessary per protocol    Result Date: 9/2/2023    Left ventricular ejection fraction appears to be 56 - 60%.   Left ventricular wall thickness is consistent with mild concentric hypertrophy   Mild tricuspid valve regurgitation is present. Estimated right ventricular systolic pressure from tricuspid regurgitation is normal (<35 mmHg).     CT Head Without Contrast    Result Date: 9/3/2023  CT HEAD WO CONTRAST Date of Exam: 9/3/2023 3:43 PM EDT Indication: dizziness. Comparison: None available. Technique: Axial CT images were obtained of the head without contrast administration.  Automated exposure control and iterative construction methods were used. Findings: No acute intracranial hemorrhage. No acute large territory infarct. There is scattered and small confluent regions of subcortical and periventricular white matter hypodensity suggestive of chronic small vessel ischemic change. No extra-axial collections.  No midline shift or herniation. Normal size and configuration of the ventricles. Normal appearance of the orbits. The paranasal sinuses are clear. The mastoid air cells are clear. No acute or suspicious bony findings.     Impression: Impression: No acute intracranial findings. Chronic and senescent changes as above. Electronically Signed: Emil Mendez MD  9/3/2023 8:17 PM EDT  Workstation ID: [id number]    Duplex Carotid Ultrasound CAR    Result Date:  9/4/2023    The study is technically difficult for diagnosis.   Right internal carotid artery demonstrates a less than 50% stenosis in the areas visualized.  The mid to distal segments were not well visualized due to vessel tortuosity, but Doppler findings were not suggestive of significant stenosis.   Left internal carotid artery demonstrates a less than 50% stenosis.   Antegrade flow in the vertebral arteries bilaterally.      Results for orders placed during the hospital encounter of 09/01/23    Adult Transthoracic Echo Complete w/ Color, Spectral and Contrast if necessary per protocol    Interpretation Summary    Left ventricular ejection fraction appears to be 56 - 60%.    Left ventricular wall thickness is consistent with mild concentric hypertrophy    Mild tricuspid valve regurgitation is present. Estimated right ventricular systolic pressure from tricuspid regurgitation is normal (<35 mmHg).      Current medications:  Scheduled Meds:aspirin-acetaminophen-caffeine, 2 tablet, Oral, Once  lisinopril, 20 mg, Oral, Daily  meclizine, 12.5 mg, Oral, Q8H  NIFEdipine, 10 mg, Oral, Q8H  senna-docusate sodium, 2 tablet, Oral, BID  sodium chloride, 10 mL, Intravenous, Q12H      Continuous Infusions:   PRN Meds:.  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        Brief Hospital Course to date:  Rosa Elena Lopez is a 58 y.o. female  with a history of hypertension who was admitted for hypertensive emergency after being resistant to multiple medications given in the ED.     This patient's  problems and plans were partially entered by my partner and updated as appropriate by me 09/04/23.      Hypertensive emergency  Headache  -Initiated on a Cardene drip, drip dc'd 9/3  - resumed home lisinopril 20 mg p.o. daily, currently blood pressure systolically is around 140s to 150s.  - trial low-dose nifedipine for DBP, currently running consistently > 80 per chart, /101 on recheck this morning  -No evidence of end organ damage at this time.  -Hemoglobin A1c is elevated at 6.30 but patient does not have any diagnosis of diabetes.  -Excedrin x 1 dose for HA     Dyspnea--PND  -Echo does not show any significant abnormality.  EF is 56-60%, very mild tricuspid regurgitation.     Dizziness  -Patient's daughter tells me that even prior to this admission patient has had few episodes of dizziness.  As far as the etiology is concerned, it is not quite clear; however, high blood pressure could have caused dizziness.  Patient complained about lightheadedness yesterday as her blood pressure was brought from above 200 systolically to close to 145.  It was thought that probably 145 is a little too low for her.  However, today she is still dizzy and as mentioned above she has had history of dizziness.  -CT head without intracranial findings, does show chronic small vessel ischemic changes  -Carotid dopplershows < 50% stenosis bilaterally, tortuous R carotid  -May try Antivert if continues     Mild cognitive impairment and learning disability.  Patient does have some memory deficit but she is oriented.      Expected Discharge Location and Transportation: Home, car  Expected Discharge pending improvement in BP, HA  Expected Discharge Date: 9/4/2023; Expected Discharge Time:      DVT prophylaxis:  Mechanical DVT prophylaxis orders are present.     AM-PAC 6 Clicks Score (PT): 24 (09/03/23 2000)    CODE STATUS:   Code Status and Medical Interventions:   Ordered at: 09/01/23 3361     Level Of Support Discussed With:     Patient     Code Status (Patient has no pulse and is not breathing):    CPR (Attempt to Resuscitate)     Medical Interventions (Patient has pulse or is breathing):    Full Support       Nicole Barney, APRN  09/04/23

## 2023-09-04 NOTE — CASE MANAGEMENT/SOCIAL WORK
Discharge Planning Assessment  [facility]     Patient Name: Rosa Elena Lopez  MRN: [id number]  Today's Date: 9/4/2023    Admit Date: 9/1/2023    Plan: IDP   Discharge Needs Assessment       Row Name 09/04/23 1457       Living Environment    People in Home child(feliberto), adult    Current Living Arrangements home    Potentially Unsafe Housing Conditions none    Primary Care Provided by self    Provides Primary Care For no one    Family Caregiver if Needed child(feliberto), adult    Quality of Family Relationships helpful;involved;supportive    Able to Return to Prior Arrangements yes       Transition Planning    Patient/Family Anticipates Transition to home with family    Patient/Family Anticipated Services at Transition none    Transportation Anticipated family or friend will provide       Discharge Needs Assessment    Equipment Currently Used at Home none                   Discharge Plan       Row Name 09/04/23 1458       Plan    Plan IDP    Patient/Family in Agreement with Plan yes    Plan Comments I spoke with Ms. Lopez, at the bedside and daughter, Selene, on the phone. Pt lives with her daughter in [facility]. Independent with ADL's, daughter transports her. No DME/HH/OPPT. No needs voiced or identified. PCP-Dr. Real Lopez. Confirmed Medicare A&B, [place] Medicaid, and prescriptions filled at [facility]. Pt plans to D/C home, dtr to transport.  will continue to follow.    Final Discharge Disposition Code 01 - home or self-care                  Continued Care and Services - Admitted Since 9/1/2023    Coordination has not been started for this encounter.       Expected Discharge Date and Time       Expected Discharge Date Expected Discharge Time    Sep 4, 2023            Demographic Summary       Row Name 09/04/23 1456       General Information    Admission Type observation    Reason for Consult discharge planning    Preferred Language English       Contact Information    Permission Granted to  Share Info With                    Functional Status       Row Name 09/04/23 1457       Functional Status    Usual Activity Tolerance good       Functional Status, IADL    Medications independent    Meal Preparation independent    Housekeeping independent    Laundry independent    Shopping independent                   Psychosocial    No documentation.                  Abuse/Neglect    No documentation.                  Legal    No documentation.                  Substance Abuse    No documentation.                  Patient Forms    No documentation.                     Martha Lopez RN

## 2023-09-04 NOTE — PLAN OF CARE
Goal Outcome Evaluation:  Plan of Care Reviewed With: patient        Progress: improving  Outcome Evaluation: Pt resting with no complaints at this time.

## 2023-09-05 ENCOUNTER — READMISSION MANAGEMENT (OUTPATIENT)
Dept: CALL CENTER | Facility: HOSPITAL | Age: 58
End: 2023-09-05
Payer: MEDICARE

## 2023-09-05 VITALS
HEART RATE: 86 BPM | WEIGHT: 186.07 LBS | SYSTOLIC BLOOD PRESSURE: 143 MMHG | DIASTOLIC BLOOD PRESSURE: 96 MMHG | BODY MASS INDEX: 29.9 KG/M2 | RESPIRATION RATE: 18 BRPM | TEMPERATURE: 98.3 F | HEIGHT: 66 IN | OXYGEN SATURATION: 99 %

## 2023-09-05 PROBLEM — I16.0 HYPERTENSIVE URGENCY: Status: ACTIVE | Noted: 2023-09-05

## 2023-09-05 PROBLEM — I16.1 HYPERTENSIVE EMERGENCY: Status: RESOLVED | Noted: 2023-09-01 | Resolved: 2023-09-05

## 2023-09-05 RX ORDER — NIFEDIPINE 60 MG/1
60 TABLET, FILM COATED, EXTENDED RELEASE ORAL
Qty: 30 TABLET | Refills: 0 | Status: SHIPPED | OUTPATIENT
Start: 2023-09-06 | End: 2023-09-07 | Stop reason: SDUPTHER

## 2023-09-05 RX ORDER — NIFEDIPINE 30 MG/1
30 TABLET, EXTENDED RELEASE ORAL
Status: DISCONTINUED | OUTPATIENT
Start: 2023-09-05 | End: 2023-09-05

## 2023-09-05 RX ORDER — MECLIZINE HCL 12.5 MG/1
12.5 TABLET ORAL 3 TIMES DAILY PRN
Qty: 60 TABLET | Refills: 0 | Status: SHIPPED | OUTPATIENT
Start: 2023-09-05

## 2023-09-05 RX ORDER — LISINOPRIL 10 MG/1
10 TABLET ORAL ONCE
Status: COMPLETED | OUTPATIENT
Start: 2023-09-05 | End: 2023-09-05

## 2023-09-05 RX ORDER — NIFEDIPINE 60 MG/1
60 TABLET, EXTENDED RELEASE ORAL
Status: DISCONTINUED | OUTPATIENT
Start: 2023-09-05 | End: 2023-09-05 | Stop reason: HOSPADM

## 2023-09-05 RX ADMIN — NIFEDIPINE 60 MG: 60 TABLET, EXTENDED RELEASE ORAL at 08:29

## 2023-09-05 RX ADMIN — Medication 10 ML: at 08:29

## 2023-09-05 RX ADMIN — LISINOPRIL 10 MG: 10 TABLET ORAL at 12:20

## 2023-09-05 RX ADMIN — SENNOSIDES AND DOCUSATE SODIUM 2 TABLET: 8.6; 5 TABLET ORAL at 08:29

## 2023-09-05 RX ADMIN — MECLIZINE 12.5 MG: 25 TABLET ORAL at 05:47

## 2023-09-05 RX ADMIN — NIFEDIPINE 10 MG: 10 CAPSULE ORAL at 05:46

## 2023-09-05 RX ADMIN — MECLIZINE 12.5 MG: 25 TABLET ORAL at 13:49

## 2023-09-05 NOTE — CASE MANAGEMENT/SOCIAL WORK
Continued Stay Note   Will     Patient Name: Rosa Elena Lopez  MRN: [id number]  Today's Date: 9/5/2023    Admit Date: 9/1/2023    Plan: update   Discharge Plan       Row Name 09/05/23 1141       Plan    Plan update    Patient/Family in Agreement with Plan yes    Plan Comments Per MDR, blood pressure 146/98.  Spoke with patient at bedside, hospitalist present and speaking with patient.  Likley to discharge tomorrow if pressures controlled.  No immediate discharge needs noted.  CM following.  Patient plan is to discharge home via car with family to transport.    Final Discharge Disposition Code 01 - home or self-care                   Discharge Codes    No documentation.                 Expected Discharge Date and Time       Expected Discharge Date Expected Discharge Time    Sep 4, 2023               Martha Shannon RN

## 2023-09-05 NOTE — DISCHARGE SUMMARY
[facility] Medicine Services  DISCHARGE SUMMARY    Patient Name: Rosa Elena Lopez  : 1965  MRN: [id number]    Date of Admission: 2023  5:44 PM  Date of Discharge:  2023  Primary Care Physician: Real Lopez DO    Consults       No orders found from 8/3/2023 to 2023.            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 female with a history of hypertension who was admitted for hypertensive emergency after being resistant to multiple medications given in the ED. Patient was initiated on a Caredene drip which was discontinued on 9/3/23. She was placed back on her home dose lisinopril 20 mg daily. Initially, BP was better controlled but then began to go back up. Patient was started on nifedipine XL 60 mg daily given elevated diastolic BP. She will continue both medications at discharge. She has been instructed to check her BP two times a day for one week and to take these measurements to her follow-up appointment with her primary care provider. We also discussed taking one of her BP medications in the morning and the other in the evening if she feels light-headed when taking them together.     Hypertensive emergency, resolved  Headache, resolved  -No evidence of end organ damage at this time.  -Hemoglobin A1c is elevated at 6.30 but patient does not have any diagnosis of diabetes     Dyspnea--PND  -Echo does not show any significant abnormality.  EF is 56-60%, very mild tricuspid regurgitation.     Dizziness  -Patient's daughter told partner that even prior to this admission patient has had a few episodes of dizziness.  As far as the etiology is  concerned, it is not quite clear; however, high blood pressure could have contributed to the dizziness.    -CT head without intracranial findings, does show chronic small vessel ischemic changes  -Carotid dopplershows < 50% stenosis bilaterally, tortuous R carotid  -Improved with Antivert, will continue at discharge as q8h as needed         Discharge Follow Up Recommendations for outpatient labs/diagnostics:  --Follow up with PCP in one week    Day of Discharge     HPI:   Patient resting in bed. Says headache is improved. Says dizziness is better with Antivert. BP still up overnight, so we discussed monitoring it today and probably home this evening or tomorrow if it improves and remained stable.     Review of Systems  Gen- No fevers, chills  CV- No chest pain, palpitations  Resp- No cough, dyspnea  GI- No N/V/D, abd pain    Vital Signs:   Temp:  [98.3 °F (36.8 °C)-98.6 °F (37 °C)] 98.3 °F (36.8 °C)  Heart Rate:  [] 95  Resp:  [18] 18  BP: (128-170)/() 140/95      Physical Exam:  Constitutional: No acute distress, awake, alert  HENT: NCAT, mucous membranes moist  Respiratory: Clear to auscultation bilaterally, respiratory effort normal, room air  Cardiovascular: RRR, no murmurs, rubs, or gallops  Gastrointestinal: Positive bowel sounds, soft, nontender, nondistended  Musculoskeletal: No bilateral ankle edema  Psychiatric: Appropriate affect, cooperative  Neurologic: Oriented x 3, strength symmetric in all extremities, Cranial Nerves grossly intact to confrontation, speech clear  Skin: No rashes    Pertinent  and/or Most Recent Results     LAB RESULTS:      Lab 09/02/23  0536 09/01/23  1521   WBC 7.34 6.99   HEMOGLOBIN 12.3 12.7   HEMATOCRIT 38.2 39.3   PLATELETS 257 256   NEUTROS ABS 3.56 3.44   IMMATURE GRANS (ABS) 0.02 0.02   LYMPHS ABS 2.66 2.46   MONOS ABS 0.75 0.74   EOS ABS 0.32 0.29   MCV 81.8 83.1         Lab 09/03/23  0715 09/02/23  0536 09/01/23  1521   SODIUM 138 140 138   POTASSIUM 4.2 4.0  3.9   CHLORIDE 104 102 101   CO2 25.0 26.0 30.0*   ANION GAP 9.0 12.0 7.0   BUN 14 10 9   CREATININE 0.94 0.81 0.84   EGFR 69.6 83.2 79.7   GLUCOSE 101* 96 86   CALCIUM 9.5 9.0 9.2   MAGNESIUM  --  2.0  --    PHOSPHORUS  --  3.4  --    HEMOGLOBIN A1C  --  6.30*  --    TSH  --   --  1.070         Lab 09/01/23  1521   TOTAL PROTEIN 7.7   ALBUMIN 4.4   GLOBULIN 3.3   ALT (SGPT) 15   AST (SGOT) 19   BILIRUBIN 0.2   ALK PHOS 128*         Lab 09/01/23  1521   HSTROP T 9         Lab 09/02/23  0536   CHOLESTEROL 194   LDL CHOL 124*   HDL CHOL 50   TRIGLYCERIDES 114             Brief Urine Lab Results       None          Microbiology Results (last 10 days)       ** No results found for the last 240 hours. **            Adult Transthoracic Echo Complete w/ Color, Spectral and Contrast if necessary per protocol    Result Date: 9/2/2023    Left ventricular ejection fraction appears to be 56 - 60%.   Left ventricular wall thickness is consistent with mild concentric hypertrophy   Mild tricuspid valve regurgitation is present. Estimated right ventricular systolic pressure from tricuspid regurgitation is normal (<35 mmHg).     CT Head Without Contrast    Result Date: 9/3/2023  CT HEAD WO CONTRAST Date of Exam: 9/3/2023 3:43 PM EDT Indication: dizziness. Comparison: None available. Technique: Axial CT images were obtained of the head without contrast administration.  Automated exposure control and iterative construction methods were used. Findings: No acute intracranial hemorrhage. No acute large territory infarct. There is scattered and small confluent regions of subcortical and periventricular white matter hypodensity suggestive of chronic small vessel ischemic change. No extra-axial collections.  No midline shift or herniation. Normal size and configuration of the ventricles. Normal appearance of the orbits. The paranasal sinuses are clear. The mastoid air cells are clear. No acute or suspicious bony findings.     Impression: No  acute intracranial findings. Chronic and senescent changes as above. Electronically Signed: Emil Mendez MD  9/3/2023 8:17 PM EDT  Workstation ID: [id number]    Duplex Carotid Ultrasound CAR    Result Date: 9/4/2023    The study is technically difficult for diagnosis.   Right internal carotid artery demonstrates a less than 50% stenosis in the areas visualized.  The mid to distal segments were not well visualized due to vessel tortuosity, but Doppler findings were not suggestive of significant stenosis.   Left internal carotid artery demonstrates a less than 50% stenosis.   Antegrade flow in the vertebral arteries bilaterally.      Results for orders placed during the hospital encounter of 09/01/23    Duplex Carotid Ultrasound CAR    Interpretation Summary    The study is technically difficult for diagnosis.    Right internal carotid artery demonstrates a less than 50% stenosis in the areas visualized.  The mid to distal segments were not well visualized due to vessel tortuosity, but Doppler findings were not suggestive of significant stenosis.    Left internal carotid artery demonstrates a less than 50% stenosis.    Antegrade flow in the vertebral arteries bilaterally.      Results for orders placed during the hospital encounter of 09/01/23    Duplex Carotid Ultrasound CAR    Interpretation Summary    The study is technically difficult for diagnosis.    Right internal carotid artery demonstrates a less than 50% stenosis in the areas visualized.  The mid to distal segments were not well visualized due to vessel tortuosity, but Doppler findings were not suggestive of significant stenosis.    Left internal carotid artery demonstrates a less than 50% stenosis.    Antegrade flow in the vertebral arteries bilaterally.      Results for orders placed during the hospital encounter of 09/01/23    Adult Transthoracic Echo Complete w/ Color, Spectral and Contrast if necessary per protocol    Interpretation Summary    Left  ventricular ejection fraction appears to be 56 - 60%.    Left ventricular wall thickness is consistent with mild concentric hypertrophy    Mild tricuspid valve regurgitation is present. Estimated right ventricular systolic pressure from tricuspid regurgitation is normal (<35 mmHg).      Plan for Follow-up of Pending Labs/Results:     Discharge Details        Discharge Medications        New Medications        Instructions Start Date   meclizine 12.5 MG tablet  Commonly known as: ANTIVERT   12.5 mg, Oral, 3 Times Daily PRN      NIFEdipine CC 60 MG 24 hr tablet  Commonly known as: ADALAT CC   60 mg, Oral, Every 24 Hours Scheduled   Start Date: September 6, 2023            Continue These Medications        Instructions Start Date   Blood Pressure kit   Use to check your blood pressure once daily      lisinopril 20 MG tablet  Commonly known as: PRINIVIL,ZESTRIL   20 mg, Oral, Daily               No Known Allergies      Discharge Disposition:  Home or Self Care    Diet:  Hospital:  Diet Order   Procedures    Diet: Cardiac Diets, Diabetic Diets; Healthy Heart (2-3 Na+); Consistent Carbohydrate; Texture: Regular Texture (IDDSI 7); Fluid Consistency: Thin (IDDSI 0)       Activity:  Activity Instructions       Activity as Tolerated      Measure Blood Pressure      Measure blood pressure 2 times a day for one week, once in the morning before taking your BP medications and once in the evening before dinner. Make sure you have been sitting for 5 minutes before taking your BP and that you arm is at heart level. Write down these numbers and take to your follow-up appointment with your primary care provider.            Restrictions or Other Recommendations:         CODE STATUS:    Code Status and Medical Interventions:   Ordered at: 09/01/23 7793     Level Of Support Discussed With:    Patient     Code Status (Patient has no pulse and is not breathing):    CPR (Attempt to Resuscitate)     Medical Interventions (Patient has pulse  or is breathing):    Full Support       No future appointments.    Additional Instructions for the Follow-ups that You Need to Schedule       Discharge Follow-up with PCP   As directed       Currently Documented PCP:    Real Lopez DO    PCP Phone Number:    [phone]     Follow Up Details: One week                  Nicole Barney, APRN  09/05/23      Time Spent on Discharge:  I spent  45  minutes on this discharge activity which included: face-to-face encounter with the patient, reviewing the data in the system, coordination of the care with the nursing staff as well as consultants, documentation, and entering orders.

## 2023-09-05 NOTE — PROGRESS NOTES
[facility] Medicine Services  PROGRESS NOTE    Patient Name: Rosa Elena Lopez  : 1965  MRN: [id number]    Date of Admission: 2023  Primary Care Physician: Real Lopez DO    Subjective   Subjective     CC:  F/u hypertensive urgency    HPI:  Patient resting in bed.  Says headache is improved.  Says dizziness is better with Antivert.  BP still up overnight, so we discussed monitoring it today and probably home this evening or tomorrow if it improves and remained stable.    ROS:  Gen- No fevers, chills  CV- No chest pain, palpitations  Resp- No cough, dyspnea  GI- No N/V/D, abd pain       Objective   Objective     Vital Signs:   Temp:  [98.3 °F (36.8 °C)-98.6 °F (37 °C)] 98.3 °F (36.8 °C)  Heart Rate:  [] 71  Resp:  [18] 18  BP: (128-170)/() 146/98     Physical Exam:  Constitutional: No acute distress, awake, alert  HENT: NCAT, mucous membranes moist  Respiratory: Clear to auscultation bilaterally, respiratory effort normal, room air  Cardiovascular: RRR, no murmurs, rubs, or gallops  Gastrointestinal: Positive bowel sounds, soft, nontender, nondistended  Musculoskeletal: No bilateral ankle edema  Psychiatric: Appropriate affect, cooperative  Neurologic: Oriented x 3, strength symmetric in all extremities, Cranial Nerves grossly intact to confrontation, speech clear  Skin: No rashes      Results Reviewed:  LAB RESULTS:      Lab 23  0536 23  1521   WBC 7.34 6.99   HEMOGLOBIN 12.3 12.7   HEMATOCRIT 38.2 39.3   PLATELETS 257 256   NEUTROS ABS 3.56 3.44   IMMATURE GRANS (ABS) 0.02 0.02   LYMPHS ABS 2.66 2.46   MONOS ABS 0.75 0.74   EOS ABS 0.32 0.29   MCV 81.8 83.1         Lab 23  0715 23  0536 23  1521   SODIUM 138 140 138   POTASSIUM 4.2 4.0 3.9   CHLORIDE 104 102 101   CO2 25.0 26.0 30.0*   ANION GAP 9.0 12.0 7.0   BUN 14 10 9   CREATININE 0.94 0.81 0.84   EGFR 69.6 83.2 79.7   GLUCOSE 101* 96 86   CALCIUM 9.5 9.0 9.2    MAGNESIUM  --  2.0  --    PHOSPHORUS  --  3.4  --    HEMOGLOBIN A1C  --  6.30*  --    TSH  --   --  1.070         Lab 09/01/23  1521   TOTAL PROTEIN 7.7   ALBUMIN 4.4   GLOBULIN 3.3   ALT (SGPT) 15   AST (SGOT) 19   BILIRUBIN 0.2   ALK PHOS 128*         Lab 09/01/23  1521   HSTROP T 9         Lab 09/02/23  0536   CHOLESTEROL 194   LDL CHOL 124*   HDL CHOL 50   TRIGLYCERIDES 114             Brief Urine Lab Results       None            Microbiology Results Abnormal       None            CT Head Without Contrast    Result Date: 9/3/2023  CT HEAD WO CONTRAST Date of Exam: 9/3/2023 3:43 PM EDT Indication: dizziness. Comparison: None available. Technique: Axial CT images were obtained of the head without contrast administration.  Automated exposure control and iterative construction methods were used. Findings: No acute intracranial hemorrhage. No acute large territory infarct. There is scattered and small confluent regions of subcortical and periventricular white matter hypodensity suggestive of chronic small vessel ischemic change. No extra-axial collections.  No midline shift or herniation. Normal size and configuration of the ventricles. Normal appearance of the orbits. The paranasal sinuses are clear. The mastoid air cells are clear. No acute or suspicious bony findings.     Impression: Impression: No acute intracranial findings. Chronic and senescent changes as above. Electronically Signed: Emil Mendez MD  9/3/2023 8:17 PM EDT  Workstation ID: [id number]    Duplex Carotid Ultrasound CAR    Result Date: 9/4/2023    The study is technically difficult for diagnosis.   Right internal carotid artery demonstrates a less than 50% stenosis in the areas visualized.  The mid to distal segments were not well visualized due to vessel tortuosity, but Doppler findings were not suggestive of significant stenosis.   Left internal carotid artery demonstrates a less than 50% stenosis.   Antegrade flow in the vertebral arteries  bilaterally.      Results for orders placed during the hospital encounter of 09/01/23    Adult Transthoracic Echo Complete w/ Color, Spectral and Contrast if necessary per protocol    Interpretation Summary    Left ventricular ejection fraction appears to be 56 - 60%.    Left ventricular wall thickness is consistent with mild concentric hypertrophy    Mild tricuspid valve regurgitation is present. Estimated right ventricular systolic pressure from tricuspid regurgitation is normal (<35 mmHg).      Current medications:  Scheduled Meds:lisinopril, 10 mg, Oral, Once  meclizine, 12.5 mg, Oral, Q8H  NIFEdipine XL, 60 mg, Oral, Q24H  senna-docusate sodium, 2 tablet, Oral, BID  sodium chloride, 10 mL, Intravenous, Q12H      Continuous Infusions:   PRN Meds:.  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        Brief Hospital Course to date:  Rosa Elena Lopez is a 58 y.o. female  with a history of hypertension who was admitted for hypertensive emergency after being resistant to multiple medications given in the ED.     This patient's problems and plans were partially entered by my partner and updated as appropriate by me 09/05/23.      Hypertensive emergency  Headache  -Initiated on a Cardene drip, drip dc'd 9/3  -No evidence of end organ damage at this time.  -Hemoglobin A1c is elevated at 6.30 but patient does not have any diagnosis of diabetes  - resumed home lisinopril 20 mg p.o. daily, BP initially improved but now running higher, give lisinopril 10 mg x 1 with trial of nifedipine  -  trial nifedipine XL 60mg daily, DBP running  > 100 per chart over past 24 hours     Dyspnea--PND  -Echo does not show any significant abnormality.  EF is 56-60%, very mild tricuspid regurgitation.     Dizziness  -Patient's daughter told partner that even prior to this admission patient has had few episodes of dizziness.  As far as the etiology is concerned, it is not quite clear; however, high blood pressure could have caused dizziness.    -CT head without intracranial findings, does show chronic small vessel ischemic changes  -Carotid dopplershows < 50% stenosis bilaterally, tortuous R carotid  -Improved with Antivert      Mild cognitive impairment and learning disability.  Patient does have some memory deficit but she is oriented.      Expected Discharge Location and Transportation: Home, car  Expected Discharge pending improvement in BP, possibly later today  Expected Discharge Date: 9/6/2023; Expected Discharge Time:      DVT prophylaxis:  Mechanical DVT prophylaxis orders are present.     AM-PAC 6 Clicks Score (PT): 24 (09/04/23 0800)    CODE STATUS:   Code Status and Medical Interventions:   Ordered at: 09/01/23 2301     Level Of Support Discussed With:    Patient     Code Status (Patient has no pulse and is not breathing):    CPR (Attempt to Resuscitate)     Medical Interventions (Patient has pulse or is breathing):    Full Support       Nicole Barney, APRN  09/05/23

## 2023-09-05 NOTE — PLAN OF CARE
Problem: Adult Inpatient Plan of Care  Goal: Plan of Care Review  Outcome: Ongoing, Progressing  Flowsheets  Taken 9/5/2023 0459 by Fidelia Lopez RN  Progress: improving  Plan of Care Reviewed With: patient  Taken 9/4/2023 0759 by Tonia Alas RN  Outcome Evaluation: Pt resting with no complaints at this time.     Problem: Adult Inpatient Plan of Care  Goal: Absence of Hospital-Acquired Illness or Injury  Intervention: Identify and Manage Fall Risk  Recent Flowsheet Documentation  Taken 9/5/2023 0458 by Fidelia Lopez RN  Safety Promotion/Fall Prevention:   fall prevention program maintained   safety round/check completed  Taken 9/5/2023 0110 by Fidelia Lopez RN  Safety Promotion/Fall Prevention:   fall prevention program maintained   safety round/check completed  Taken 9/4/2023 2147 by Fidelia Lopez RN  Safety Promotion/Fall Prevention:   fall prevention program maintained   safety round/check completed     Problem: Adult Inpatient Plan of Care  Goal: Absence of Hospital-Acquired Illness or Injury  Intervention: Prevent Infection  Recent Flowsheet Documentation  Taken 9/5/2023 0458 by Fidelia Lopez, RN  Infection Prevention:   rest/sleep promoted   single patient room provided  Taken 9/5/2023 0110 by Fidelia Lopez, GAIL  Infection Prevention:   rest/sleep promoted   single patient room provided  Taken 9/4/2023 2147 by Fidelia Lopez, RN  Infection Prevention:   rest/sleep promoted   single patient room provided   Goal Outcome Evaluation:  Plan of Care Reviewed With: patient        Progress: improving

## 2023-09-06 ENCOUNTER — TRANSITIONAL CARE MANAGEMENT TELEPHONE ENCOUNTER (OUTPATIENT)
Dept: CALL CENTER | Facility: HOSPITAL | Age: 58
End: 2023-09-06
Payer: MEDICARE

## 2023-09-06 NOTE — CASE MANAGEMENT/SOCIAL WORK
Case Management Discharge Note      Final Note: Patient discharged home, no needs noted at CM encounter.         Selected Continued Care - Discharged on 9/5/2023 Admission date: 9/1/2023 - Discharge disposition: Home or Self Care      Destination    No services have been selected for the patient.                Durable Medical Equipment    No services have been selected for the patient.                Dialysis/Infusion    No services have been selected for the patient.                Home Medical Care    No services have been selected for the patient.                Therapy    No services have been selected for the patient.                Community Resources    No services have been selected for the patient.                Community & DME    No services have been selected for the patient.                         Final Discharge Disposition Code: 01 - home or self-care

## 2023-09-06 NOTE — OUTREACH NOTE
Call Center TCM Note      Flowsheet Row Responses   [facility] patient discharged from? [place]   Does the patient have one of the following disease processes/diagnoses(primary or secondary)? Other   TCM attempt successful? No   Unsuccessful attempts Attempt 1            Pennie Lopez RN    9/6/2023, 12:58 EDT

## 2023-09-06 NOTE — OUTREACH NOTE
Prep Survey      Flowsheet Row Responses   [facility] patient discharged from? [place]   Is LACE score < 7 ? Yes   Eligibility [facility]   Date of Admission 09/01/23   Date of Discharge 09/05/23   Discharge Disposition Home or Self Care   Discharge diagnosis Hypertensive urgency   Does the patient have one of the following disease processes/diagnoses(primary or secondary)? Other   Does the patient have Home health ordered? No   Is there a DME ordered? No   Prep survey completed? Yes            Kerri SMILEY - Registered Nurse

## 2023-09-06 NOTE — OUTREACH NOTE
Call Center TCM Note      Flowsheet Row Responses   [facility] patient discharged from? [place]   Does the patient have one of the following disease processes/diagnoses(primary or secondary)? Other   TCM attempt successful? No   Unsuccessful attempts Attempt 2            Pennie Lopez RN    9/6/2023, 14:31 EDT

## 2023-09-07 ENCOUNTER — OFFICE VISIT (OUTPATIENT)
Dept: FAMILY MEDICINE CLINIC | Facility: CLINIC | Age: 58
End: 2023-09-07
Payer: MEDICARE

## 2023-09-07 ENCOUNTER — TRANSITIONAL CARE MANAGEMENT TELEPHONE ENCOUNTER (OUTPATIENT)
Dept: CALL CENTER | Facility: HOSPITAL | Age: 58
End: 2023-09-07
Payer: MEDICARE

## 2023-09-07 VITALS
HEIGHT: 66 IN | DIASTOLIC BLOOD PRESSURE: 80 MMHG | SYSTOLIC BLOOD PRESSURE: 120 MMHG | BODY MASS INDEX: 27.74 KG/M2 | WEIGHT: 172.6 LBS

## 2023-09-07 DIAGNOSIS — R73.03 PREDIABETES: ICD-10-CM

## 2023-09-07 DIAGNOSIS — I10 PRIMARY HYPERTENSION: Primary | ICD-10-CM

## 2023-09-07 RX ORDER — BLOOD PRESSURE TEST KIT
KIT MISCELLANEOUS
Qty: 1 EACH | Refills: 0 | Status: SHIPPED | OUTPATIENT
Start: 2023-09-07

## 2023-09-07 RX ORDER — LISINOPRIL 20 MG/1
20 TABLET ORAL DAILY
Qty: 90 TABLET | Refills: 3 | Status: SHIPPED | OUTPATIENT
Start: 2023-09-07

## 2023-09-07 RX ORDER — NIFEDIPINE 60 MG/1
60 TABLET, FILM COATED, EXTENDED RELEASE ORAL
Qty: 90 TABLET | Refills: 1 | Status: SHIPPED | OUTPATIENT
Start: 2023-09-07

## 2023-09-07 NOTE — OUTREACH NOTE
Call Center TCM Note      Flowsheet Row Responses   [facility] patient discharged from? [place]   Does the patient have one of the following disease processes/diagnoses(primary or secondary)? Other   TCM attempt successful? Yes   Call start time 0946   Call end time 0946   Discharge diagnosis Hypertensive urgency   TCM call completed? Yes   Wrap up additional comments Has a completed hospital f/u appt with PCP documented.   Call end time 0946   Would this patient benefit from a Referral to [facility] Social Work? No   Is the patient interested in additional calls from an ambulatory ? No            Pennie Lopez RN    9/7/2023, 09:46 EDT

## 2023-09-07 NOTE — PROGRESS NOTES
Hospital Follow-Up/Transition of Care Visit     Patient Name: Rosa Elena Lopez  : 1965   MRN: [id number]   Care Team: Patient Care Team:  Real Lopez DO as PCP - General (Family Medicine)    Chief Complaint:    Chief Complaint   Patient presents with    Hypertension     Hospital follow-up hypertension       History of Present Illness: Rosa Elena Lopez is a 58 y.o. female who presents today for TCM visit.    Within 48 business hours after discharge our office contacted her via telephone to coordinate her care and needs.      I reviewed and discussed the details of that call along with the discharge summary, hospital problems, inpatient lab results, inpatient diagnostic studies, and consultation reports with Rosa Elena.      Current outpatient and discharge medications have been reconciled for the patient.  Reviewed by: Real Lopez DO          2023     8:05 PM   Date of TCM Phone Call   [facility]   Date of Admission 2023   Date of Discharge 2023   Discharge Disposition Home or Self Care     Risk for Readmission (LACE) Score: 4 (2023  6:00 AM)      History of Present Illness   Course During Hospital Stay:    Rosa Elena Lopez is a 58 y.o. female with a history of hypertension who was admitted for hypertensive emergency after being resistant to multiple medications given in the ED. Patient was initiated on a Caredene drip which was discontinued on 9/3/23. She was placed back on her home dose lisinopril 20 mg daily. Initially, BP was better controlled but then began to go back up. Patient was started on nifedipine XL 60 mg daily given elevated diastolic BP. She will continue both medications at discharge. She has been instructed to check her BP two times a day for one week and to take these measurements to her follow-up appointment with her primary care provider. We also discussed taking one of her BP medications in the morning and the other in the  evening if she feels light-headed when taking them together.     Hypertensive emergency, resolved  Headache, resolved  -No evidence of end organ damage at this time.  -Hemoglobin A1c is elevated at 6.30 but patient does not have any diagnosis of diabetes     Dyspnea--PND  -Echo does not show any significant abnormality.  EF is 56-60%, very mild tricuspid regurgitation.     Dizziness  -Patient's daughter told partner that even prior to this admission patient has had a few episodes of dizziness.  As far as the etiology is concerned, it is not quite clear; however, high blood pressure could have contributed to the dizziness.    -CT head without intracranial findings, does show chronic small vessel ischemic changes  -Carotid dopplershows < 50% stenosis bilaterally, tortuous R carotid  -Improved with Antivert, will continue at discharge as q8h as needed           Discharge Follow Up Recommendations for outpatient labs/diagnostics:  --Follow up with PCP in one week    Status Since Discharge:  Has been doing a little better since discharge.  Still dizzy, but has only been on the new medication for a few days.  They do not have a blood pressure kit at home, last time we tried to send in a few years ago it was evidently prohibitively expensive.     The following portions of the patient's history were reviewed and updated as appropriate: allergies, current medications, past family history, past medical history, past social history, past surgical history, and problem list.    This patient is accompanied by their daughter who contributes to the history of their care.    The following portions of the patient's history were reviewed and updated as appropriate: allergies, current medications, past family history, past medical history, past social history, past surgical history and problem list.    Subjective      Review of Systems:   Review of Systems - See HPI    Past Medical History:   Past Medical History:   Diagnosis Date     "Hypertension        Past Surgical History:   Past Surgical History:   Procedure Laterality Date    HERNIA REPAIR      TUBAL ABDOMINAL LIGATION         Family History:   Family History   Problem Relation Age of Onset    Breast cancer Neg Hx     Ovarian cancer Neg Hx        Social History:   Social History     Socioeconomic History    Marital status: Single   Tobacco Use    Smoking status: Never    Smokeless tobacco: Never   Vaping Use    Vaping Use: Never used   Substance and Sexual Activity    Alcohol use: No    Drug use: No    Sexual activity: Defer       Tobacco History:   Social History     Tobacco Use   Smoking Status Never   Smokeless Tobacco Never       Medications:     Current Outpatient Medications:     Blood Pressure kit, Use to check your blood pressure once daily, Disp: 1 each, Rfl: 0    lisinopril (PRINIVIL,ZESTRIL) 20 MG tablet, Take 1 tablet by mouth Daily., Disp: 90 tablet, Rfl: 3    meclizine (ANTIVERT) 12.5 MG tablet, Take 1 tablet by mouth 3 (Three) Times a Day As Needed for Dizziness., Disp: 60 tablet, Rfl: 0    NIFEdipine CC (ADALAT CC) 60 MG 24 hr tablet, Take 1 tablet by mouth Daily., Disp: 90 tablet, Rfl: 1    Allergies:   No Known Allergies    Objective   Objective     Physical Exam:  Vital Signs:   Vitals:    09/07/23 0900   BP: 120/80   BP Location: Left arm   Patient Position: Sitting   Cuff Size: Adult   Weight: 78.3 kg (172 lb 9.6 oz)   Height: 167.6 cm (65.98\")     Body mass index is 27.88 kg/m².     Physical Exam  Nursing note reviewed  Eyes: EOMI, no conjunctivitis  ENT: No nasal discharge present, neck supple  Cardiac: Regular rate and rhythm, no cyanosis  Resp: Respiratory rate within normal limits, no increased work of breathing, no audible wheezing or retractions noted  Procedures/Radiology     Procedures  CT Head Without Contrast    Result Date: 9/3/2023  Impression: No acute intracranial findings. Chronic and senescent changes as above. Electronically Signed: mEil Mendez MD  " 9/3/2023 8:17 PM EDT  Workstation ID: [id number]      Assessment & Plan   Assessment / Plan      Assessment/Plan:   Problems Addressed This Visit  Diagnoses and all orders for this visit:    1. Primary hypertension (Primary)  -     NIFEdipine CC (ADALAT CC) 60 MG 24 hr tablet; Take 1 tablet by mouth Daily.  Dispense: 90 tablet; Refill: 1  -     Blood Pressure kit; Use to check your blood pressure once daily  Dispense: 1 each; Refill: 0  -     lisinopril (PRINIVIL,ZESTRIL) 20 MG tablet; Take 1 tablet by mouth Daily.  Dispense: 90 tablet; Refill: 3    2. Prediabetes  Assessment & Plan:  New dx September 2023, A1c 6.3%. Recheck 6 months.        Problem List Items Addressed This Visit          Cardiac and Vasculature    Hypertension - Primary    Overview     Lisinopril 20 mg daily, previously on amlodipine 10 mg but went off over the last few years.  Recently started nifedipine XL 60 mg daily and hospitalization September 2023         Relevant Medications    NIFEdipine CC (ADALAT CC) 60 MG 24 hr tablet    Blood Pressure kit    lisinopril (PRINIVIL,ZESTRIL) 20 MG tablet       Endocrine and Metabolic    Prediabetes    Current Assessment & Plan     New dx September 2023, A1c 6.3%. Recheck 6 months.              See patient diagnoses and orders along with patient instructions for assessment, plan, and changes to care for patient.    There are no Patient Instructions on file for this visit.    Follow Up:   Return in about 6 months (around 3/7/2024) for with A1c;.        MDM       [facility] [address]  [facility]  [address]  [facility] [address]  Fax [phone]  Phone [phone]

## 2024-01-03 DIAGNOSIS — I10 PRIMARY HYPERTENSION: ICD-10-CM

## 2024-01-03 RX ORDER — LISINOPRIL 20 MG/1
20 TABLET ORAL DAILY
Qty: 90 TABLET | Refills: 3 | Status: SHIPPED | OUTPATIENT
Start: 2024-01-03

## 2024-03-04 ENCOUNTER — OFFICE VISIT (OUTPATIENT)
Dept: FAMILY MEDICINE CLINIC | Facility: CLINIC | Age: 59
End: 2024-03-04
Payer: MEDICARE

## 2024-03-04 VITALS
SYSTOLIC BLOOD PRESSURE: 130 MMHG | BODY MASS INDEX: 25.68 KG/M2 | HEIGHT: 66 IN | WEIGHT: 159.8 LBS | DIASTOLIC BLOOD PRESSURE: 76 MMHG

## 2024-03-04 DIAGNOSIS — I10 PRIMARY HYPERTENSION: ICD-10-CM

## 2024-03-04 DIAGNOSIS — M19.071 ARTHRITIS OF RIGHT FOOT: ICD-10-CM

## 2024-03-04 DIAGNOSIS — M25.774 OSTEOPHYTE OF RIGHT FOOT: ICD-10-CM

## 2024-03-04 DIAGNOSIS — R73.03 PREDIABETES: Primary | ICD-10-CM

## 2024-03-04 LAB
EXPIRATION DATE: NORMAL
HBA1C MFR BLD: 5.7 % (ref 4.5–5.7)
Lab: NORMAL

## 2024-03-04 NOTE — PROGRESS NOTES
Established Patient Office Visit      Patient Name: Rosa Elena Lopez  : 1965   MRN: [id number]   Care Team: Patient Care Team:  Real Lopez DO as PCP - General (Family Medicine)    Chief Complaint:    Chief Complaint   Patient presents with    Hypertension    Prediabetes     Follow-up HTN       History of Present Illness: Rosa Elena Lopez is a 59 y.o. female who is here today for chief complaint.    HPI    In process of of getting dentures, so has lost a fair amount of weight since September.    This patient is accompanied by their self who contributes to the history of their care.    The following portions of the patient's history were reviewed and updated as appropriate: allergies, current medications, past family history, past medical history, past social history, past surgical history and problem list.    Subjective      Review of Systems:   Review of Systems - See HPI    Past Medical History:   Past Medical History:   Diagnosis Date    Hypertension        Past Surgical History:   Past Surgical History:   Procedure Laterality Date    HERNIA REPAIR      TUBAL ABDOMINAL LIGATION         Family History:   Family History   Problem Relation Age of Onset    Breast cancer Neg Hx     Ovarian cancer Neg Hx        Social History:   Social History     Socioeconomic History    Marital status: Single   Tobacco Use    Smoking status: Never    Smokeless tobacco: Never   Vaping Use    Vaping status: Never Used   Substance and Sexual Activity    Alcohol use: No    Drug use: No    Sexual activity: Defer       Tobacco History:   Social History     Tobacco Use   Smoking Status Never   Smokeless Tobacco Never       Medications:     Current Outpatient Medications:     Blood Pressure kit, Use to check your blood pressure once daily, Disp: 1 each, Rfl: 0    lisinopril (PRINIVIL,ZESTRIL) 20 MG tablet, Take 1 tablet by mouth Daily., Disp: 90 tablet, Rfl: 3    meclizine (ANTIVERT) 12.5 MG tablet, Take 1 tablet by  "mouth 3 (Three) Times a Day As Needed for Dizziness., Disp: 60 tablet, Rfl: 0    NIFEdipine CC (ADALAT CC) 60 MG 24 hr tablet, Take 1 tablet by mouth Daily., Disp: 90 tablet, Rfl: 1    Allergies:   No Known Allergies    Objective   Objective     Physical Exam:  Vital Signs:   Vitals:    03/04/24 1129   BP: 130/76   BP Location: Left arm   Patient Position: Sitting   Cuff Size: Adult   Weight: 72.5 kg (159 lb 12.8 oz)   Height: 167.6 cm (65.99\")     Body mass index is 25.8 kg/m².     Physical Exam  Nursing note reviewed  Const: NAD, A&Ox4, Pleasant, Cooperative  Eyes: EOMI, no conjunctivitis  ENT: No nasal discharge present, neck supple  Cardiac: Regular rate and rhythm, no cyanosis  Resp: Respiratory rate within normal limits, no increased work of breathing, no audible wheezing or retractions noted  GI: No distention or ascites  MSK: Motor and sensation grossly intact in bilateral upper extremities  Neurologic: CN II-XII grossly intact  Psych: Appropriate mood and behavior.  Skin: Warm, dry  Procedures/Radiology     Procedures  No radiology results for the last 7 days     Assessment & Plan   Assessment / Plan      Assessment/Plan:   Problems Addressed This Visit  Diagnoses and all orders for this visit:    1. Prediabetes (Primary)  -     POC Glycosylated Hemoglobin (Hb A1C)    2. Primary hypertension    3. Osteophyte of right foot  -     Ambulatory Referral to Orthopedic Surgery  -     CT Lower Extremity Right Without Contrast; Future    4. Arthritis of right foot  -     Ambulatory Referral to Orthopedic Surgery  -     CT Lower Extremity Right Without Contrast; Future      Problem List Items Addressed This Visit          Cardiac and Vasculature    Hypertension    Overview     Lisinopril 20 mg daily, previously on amlodipine 10 mg but went off over the last few years.  Recently started nifedipine XL 60 mg daily and hospitalization September 2023            Endocrine and Metabolic    Prediabetes - Primary    Relevant " Orders    POC Glycosylated Hemoglobin (Hb A1C) (Completed)     Other Visit Diagnoses       Osteophyte of right foot        Relevant Orders    Ambulatory Referral to Orthopedic Surgery    CT Lower Extremity Right Without Contrast    Arthritis of right foot        Relevant Orders    Ambulatory Referral to Orthopedic Surgery    CT Lower Extremity Right Without Contrast                Patient Instructions   A1c today is 5.7%, down from 6.3% in September and very good!  Blood pressure looks great today    Follow Up:   Return in about 6 months (around 9/4/2024) for Medicare Wellness.        DO MELISSA Morales RD  [facility]  [address]  [facility] [address]  Fax [phone]  Phone [phone]

## 2024-08-13 ENCOUNTER — TELEPHONE (OUTPATIENT)
Dept: FAMILY MEDICINE CLINIC | Facility: CLINIC | Age: 59
End: 2024-08-13
Payer: MEDICARE

## 2024-08-13 NOTE — TELEPHONE ENCOUNTER
Caller: FOZIA ARAGON    Relationship: Emergency Contact    Best call back number: [phone]     Which medication are you concerned about: LISINOPRIL 20 MG    Who prescribed you this medication: DR. MOFFETT    When did you start taking this medication: A WHILE    What are your concerns: THE PATIENT IS CALLING TO LET THE PROVIDER KNOW THAT THE LISINOPRIL IS NOT WORKING.     THE PATIENT IS STILL GETTING DIZZY, AND BP IS STILL RUNNING HIGH.    PLEASE CALL THE PATIENT ASAP TO DISCUSS HER CONCERNS     How long have you had these concerns:

## 2024-08-19 ENCOUNTER — LAB (OUTPATIENT)
Dept: LAB | Facility: HOSPITAL | Age: 59
End: 2024-08-19
Payer: MEDICARE

## 2024-08-19 ENCOUNTER — OFFICE VISIT (OUTPATIENT)
Dept: FAMILY MEDICINE CLINIC | Facility: CLINIC | Age: 59
End: 2024-08-19
Payer: MEDICARE

## 2024-08-19 VITALS
DIASTOLIC BLOOD PRESSURE: 102 MMHG | WEIGHT: 168.6 LBS | HEART RATE: 77 BPM | OXYGEN SATURATION: 100 % | BODY MASS INDEX: 27.1 KG/M2 | HEIGHT: 66 IN | SYSTOLIC BLOOD PRESSURE: 160 MMHG

## 2024-08-19 DIAGNOSIS — Z13.220 SCREENING FOR HYPERLIPIDEMIA: ICD-10-CM

## 2024-08-19 DIAGNOSIS — I10 PRIMARY HYPERTENSION: Primary | ICD-10-CM

## 2024-08-19 DIAGNOSIS — Z13.89 SCREENING FOR BLOOD OR PROTEIN IN URINE: ICD-10-CM

## 2024-08-19 DIAGNOSIS — Z13.29 SCREENING FOR ENDOCRINE DISORDER: ICD-10-CM

## 2024-08-19 DIAGNOSIS — Z13.0 SCREENING FOR DEFICIENCY ANEMIA: ICD-10-CM

## 2024-08-19 DIAGNOSIS — R73.03 PREDIABETES: ICD-10-CM

## 2024-08-19 DIAGNOSIS — Z11.59 ENCOUNTER FOR HEPATITIS C SCREENING TEST FOR LOW RISK PATIENT: ICD-10-CM

## 2024-08-19 LAB
ALBUMIN SERPL-MCNC: 4.6 G/DL (ref 3.5–5.2)
ALBUMIN UR-MCNC: 3.7 MG/DL
ALBUMIN/GLOB SERPL: 1.4 G/DL
ALP SERPL-CCNC: 136 U/L (ref 39–117)
ALT SERPL W P-5'-P-CCNC: 17 U/L (ref 1–33)
ANION GAP SERPL CALCULATED.3IONS-SCNC: 10.4 MMOL/L (ref 5–15)
AST SERPL-CCNC: 16 U/L (ref 1–32)
BACTERIA UR QL AUTO: ABNORMAL /HPF
BASOPHILS # BLD AUTO: 0.02 10*3/MM3 (ref 0–0.2)
BASOPHILS NFR BLD AUTO: 0.3 % (ref 0–1.5)
BILIRUB SERPL-MCNC: 0.2 MG/DL (ref 0–1.2)
BILIRUB UR QL STRIP: NEGATIVE
BUN SERPL-MCNC: 11 MG/DL (ref 8–23)
BUN/CREAT SERPL: 11.2 (ref 7–25)
CALCIUM SPEC-SCNC: 10.3 MG/DL (ref 8.6–10.5)
CHLORIDE SERPL-SCNC: 103 MMOL/L (ref 98–107)
CHOLEST SERPL-MCNC: 261 MG/DL (ref 0–200)
CLARITY UR: CLEAR
CO2 SERPL-SCNC: 26.6 MMOL/L (ref 22–29)
COLOR UR: YELLOW
CREAT SERPL-MCNC: 0.98 MG/DL (ref 0.57–1)
CREAT UR-MCNC: 255.5 MG/DL
DEPRECATED RDW RBC AUTO: 45.4 FL (ref 37–54)
EGFRCR SERPLBLD CKD-EPI 2021: 65.8 ML/MIN/1.73
EOSINOPHIL # BLD AUTO: 0.27 10*3/MM3 (ref 0–0.4)
EOSINOPHIL NFR BLD AUTO: 4.5 % (ref 0.3–6.2)
ERYTHROCYTE [DISTWIDTH] IN BLOOD BY AUTOMATED COUNT: 15.8 % (ref 12.3–15.4)
GLOBULIN UR ELPH-MCNC: 3.3 GM/DL
GLUCOSE SERPL-MCNC: 84 MG/DL (ref 65–99)
GLUCOSE UR STRIP-MCNC: NEGATIVE MG/DL
HBA1C MFR BLD: 6 % (ref 4.8–5.6)
HCT VFR BLD AUTO: 40.2 % (ref 34–46.6)
HCV AB SER QL: NORMAL
HDLC SERPL-MCNC: 54 MG/DL (ref 40–60)
HGB BLD-MCNC: 13 G/DL (ref 12–15.9)
HGB UR QL STRIP.AUTO: NEGATIVE
HYALINE CASTS UR QL AUTO: ABNORMAL /LPF
IMM GRANULOCYTES # BLD AUTO: 0.01 10*3/MM3 (ref 0–0.05)
IMM GRANULOCYTES NFR BLD AUTO: 0.2 % (ref 0–0.5)
KETONES UR QL STRIP: ABNORMAL
LDLC SERPL CALC-MCNC: 169 MG/DL (ref 0–100)
LDLC/HDLC SERPL: 3.07 {RATIO}
LEUKOCYTE ESTERASE UR QL STRIP.AUTO: ABNORMAL
LYMPHOCYTES # BLD AUTO: 1.94 10*3/MM3 (ref 0.7–3.1)
LYMPHOCYTES NFR BLD AUTO: 32.5 % (ref 19.6–45.3)
MCH RBC QN AUTO: 25.9 PG (ref 26.6–33)
MCHC RBC AUTO-ENTMCNC: 32.3 G/DL (ref 31.5–35.7)
MCV RBC AUTO: 80.2 FL (ref 79–97)
MICROALBUMIN/CREAT UR: 14.5 MG/G (ref 0–29)
MONOCYTES # BLD AUTO: 0.61 10*3/MM3 (ref 0.1–0.9)
MONOCYTES NFR BLD AUTO: 10.2 % (ref 5–12)
NEUTROPHILS NFR BLD AUTO: 3.12 10*3/MM3 (ref 1.7–7)
NEUTROPHILS NFR BLD AUTO: 52.3 % (ref 42.7–76)
NITRITE UR QL STRIP: NEGATIVE
NRBC BLD AUTO-RTO: 0 /100 WBC (ref 0–0.2)
PH UR STRIP.AUTO: 7 [PH] (ref 5–8)
PLATELET # BLD AUTO: 258 10*3/MM3 (ref 140–450)
PMV BLD AUTO: 10.7 FL (ref 6–12)
POTASSIUM SERPL-SCNC: 4.3 MMOL/L (ref 3.5–5.2)
PROT SERPL-MCNC: 7.9 G/DL (ref 6–8.5)
PROT UR QL STRIP: ABNORMAL
RBC # BLD AUTO: 5.01 10*6/MM3 (ref 3.77–5.28)
RBC # UR STRIP: ABNORMAL /HPF
REF LAB TEST METHOD: ABNORMAL
SODIUM SERPL-SCNC: 140 MMOL/L (ref 136–145)
SP GR UR STRIP: 1.02 (ref 1–1.03)
SQUAMOUS #/AREA URNS HPF: ABNORMAL /HPF
TRIGL SERPL-MCNC: 206 MG/DL (ref 0–150)
TSH SERPL DL<=0.05 MIU/L-ACNC: 1.33 UIU/ML (ref 0.27–4.2)
UROBILINOGEN UR QL STRIP: ABNORMAL
VLDLC SERPL-MCNC: 38 MG/DL (ref 5–40)
WBC # UR STRIP: ABNORMAL /HPF
WBC NRBC COR # BLD AUTO: 5.97 10*3/MM3 (ref 3.4–10.8)

## 2024-08-19 PROCEDURE — 99214 OFFICE O/P EST MOD 30 MIN: CPT | Performed by: FAMILY MEDICINE

## 2024-08-19 PROCEDURE — 1126F AMNT PAIN NOTED NONE PRSNT: CPT | Performed by: FAMILY MEDICINE

## 2024-08-19 PROCEDURE — 3077F SYST BP >= 140 MM HG: CPT | Performed by: FAMILY MEDICINE

## 2024-08-19 PROCEDURE — 80053 COMPREHEN METABOLIC PANEL: CPT

## 2024-08-19 PROCEDURE — 81001 URINALYSIS AUTO W/SCOPE: CPT

## 2024-08-19 PROCEDURE — 82570 ASSAY OF URINE CREATININE: CPT

## 2024-08-19 PROCEDURE — 84443 ASSAY THYROID STIM HORMONE: CPT

## 2024-08-19 PROCEDURE — 86803 HEPATITIS C AB TEST: CPT

## 2024-08-19 PROCEDURE — 85025 COMPLETE CBC W/AUTO DIFF WBC: CPT

## 2024-08-19 PROCEDURE — 83036 HEMOGLOBIN GLYCOSYLATED A1C: CPT

## 2024-08-19 PROCEDURE — 80061 LIPID PANEL: CPT

## 2024-08-19 PROCEDURE — 82043 UR ALBUMIN QUANTITATIVE: CPT

## 2024-08-19 PROCEDURE — 3080F DIAST BP >= 90 MM HG: CPT | Performed by: FAMILY MEDICINE

## 2024-08-19 RX ORDER — LISINOPRIL 20 MG/1
20 TABLET ORAL DAILY
Qty: 90 TABLET | Refills: 3 | Status: SHIPPED | OUTPATIENT
Start: 2024-08-19

## 2025-07-23 DIAGNOSIS — I10 PRIMARY HYPERTENSION: ICD-10-CM

## 2025-07-23 RX ORDER — LISINOPRIL 20 MG/1
20 TABLET ORAL DAILY
Qty: 90 TABLET | Refills: 0 | Status: SHIPPED | OUTPATIENT
Start: 2025-07-23

## 2025-07-23 NOTE — TELEPHONE ENCOUNTER
Caller: FOZIA ARAGON    Relationship: Emergency Contact    Best call back number: [phone]     Requested Prescriptions:   Requested Prescriptions     Pending Prescriptions Disp Refills    lisinopril (PRINIVIL,ZESTRIL) 20 MG tablet 90 tablet 3     Sig: Take 1 tablet by mouth Daily.    NIFEdipine CC (ADALAT CC) 60 MG 24 hr tablet 90 tablet 3     Sig: Take 1 tablet by mouth Daily.        Pharmacy where request should be sent: [id number] - [place], [place] - [address] [place] [address] [phone] [facility] [phone] FX     Last office visit with prescribing clinician: 8/19/2024   Last telemedicine visit with prescribing clinician: Visit date not found   Next office visit with prescribing clinician: Visit date not found     Additional details provided by patient: PATIENT IS OUT    Does the patient have less than a 3 day supply:  [x] Yes  [] No    Would you like a call back once the refill request has been completed: [] Yes [x] No    If the office needs to give you a call back, can they leave a voicemail: [x] Yes [] No    Christiano Navarro Rep   07/23/25 14:37 EDT